# Patient Record
Sex: FEMALE | Race: WHITE | NOT HISPANIC OR LATINO | Employment: FULL TIME | URBAN - METROPOLITAN AREA
[De-identification: names, ages, dates, MRNs, and addresses within clinical notes are randomized per-mention and may not be internally consistent; named-entity substitution may affect disease eponyms.]

---

## 2017-03-16 DIAGNOSIS — C73 CANCER OF THYROID (HCC): ICD-10-CM

## 2017-03-16 RX ORDER — LEVOTHYROXINE SODIUM 100 MCG
TABLET ORAL
Qty: 30 TABLET | Refills: 3 | Status: SHIPPED | OUTPATIENT
Start: 2017-03-16 | End: 2017-07-24 | Stop reason: SDUPTHER

## 2017-07-24 DIAGNOSIS — C73 CANCER OF THYROID (HCC): ICD-10-CM

## 2017-07-24 RX ORDER — LEVOTHYROXINE SODIUM 100 MCG
TABLET ORAL
Qty: 30 TABLET | Refills: 1 | Status: SHIPPED | OUTPATIENT
Start: 2017-07-24 | End: 2017-09-18 | Stop reason: SDUPTHER

## 2017-08-22 ENCOUNTER — APPOINTMENT (OUTPATIENT)
Dept: WOMENS IMAGING | Facility: HOSPITAL | Age: 57
End: 2017-08-22

## 2017-08-22 PROCEDURE — 77067 SCR MAMMO BI INCL CAD: CPT | Performed by: RADIOLOGY

## 2017-09-18 DIAGNOSIS — C73 CANCER OF THYROID (HCC): ICD-10-CM

## 2017-09-18 RX ORDER — LEVOTHYROXINE SODIUM 100 MCG
TABLET ORAL
Qty: 30 TABLET | Refills: 3 | Status: SHIPPED | OUTPATIENT
Start: 2017-09-18 | End: 2017-11-16

## 2017-11-10 ENCOUNTER — OFFICE VISIT (OUTPATIENT)
Dept: ENDOCRINOLOGY | Age: 57
End: 2017-11-10

## 2017-11-10 VITALS
SYSTOLIC BLOOD PRESSURE: 110 MMHG | DIASTOLIC BLOOD PRESSURE: 80 MMHG | WEIGHT: 199.6 LBS | HEART RATE: 69 BPM | HEIGHT: 67 IN | OXYGEN SATURATION: 96 % | BODY MASS INDEX: 31.33 KG/M2

## 2017-11-10 DIAGNOSIS — C73 PAPILLARY THYROID CARCINOMA (HCC): ICD-10-CM

## 2017-11-10 DIAGNOSIS — E89.0 POST-OPERATIVE HYPOTHYROIDISM: Primary | ICD-10-CM

## 2017-11-10 DIAGNOSIS — E55.9 VITAMIN D DEFICIENCY: ICD-10-CM

## 2017-11-10 DIAGNOSIS — R53.82 CHRONIC FATIGUE: ICD-10-CM

## 2017-11-10 DIAGNOSIS — R68.89 ABNORMAL WEIGHT: ICD-10-CM

## 2017-11-10 PROCEDURE — 99214 OFFICE O/P EST MOD 30 MIN: CPT | Performed by: INTERNAL MEDICINE

## 2017-11-10 NOTE — PROGRESS NOTES
57 y.o.    Patient Care Team:  Morris Carrington DO as PCP - General  Morris Carrington DO as PCP - Family Medicine    Chief Complaint:      F/U POSTOPERATIVE HYPOTHYROIDISM.  PAPILLARY THYROID CANCER.  HERE TO DISCUSS LAB RESULTS.  Subjective     HPI   Patient is a 57-year-old white female with a past history of papillary thyroid cancer diagnosed in 2003.  Came for follow-up  Postoperatively there was no vascular invasion and that her lymph nodes were negative for malignancy  She was also known to have Hashimoto's thyroiditis in the background    Postoperative hypothyroidism  Patient is currently taking Synthroid brand name 100 µg daily.  She is tolerating the medication well.  She reports compliance  She denies any side effects  Papillary thyroid cancer status post total thyroidectomy  Patient surgery was nearly 14 years ago and her TSH could be more in the normal range but below 2  Abnormal weight gain  Patient gained an additional weight since last year.  She currently weighs 199 pounds with a BMI of 31.3.  Fatigue  Patient reports constant fatigue.  She reports that her stress levels are very high recently, she resigned from her job, she is taking care of her elderly mother  Hyperlipidemia  Patient reports that she was unable to take any statin drugs in the past due to side effects.  Her last LDL was 124  Patient is a strong family history of coronary artery disease  Vitamin D deficiency  Patient is currently taking 2000 units vitamin D daily       Interval History        The patient had undergone radioiodine ablation following a total thyroidectomy. She was then followed by a whole body scan which only showed activity in the thyroid bed. She has had Thyrogen stimulated whole body scans that were negative. She had a CT scan of her neck in 2007 that showed multiple benign lymph nodes in her neck. I also performed a thyroid ultrasound and 2013 which showed multiple benign lymph nodes that were about 1.5 cm.  Thyroglobulin levels have been undetectable and she has had negative anti-thyroglobulin antibodies levels.      Her TSH has been 0 over the last 7 years. She is currently on Synthroid 125 mcg daily takes it in the morning on an empty stomach and waits before eating.      Her complaints today include problems with memory, decreased concentration, fatigue, weight gain although her weight has been stable per our records. She has also dry skin.  Patient reported that she used to experience tremors and palpitations at the dose of 125 mcg and since her dose was reduced to 100 mcg she does not experience these symptoms anymore  She has been menopausal for the last 5 years.   Hypothyroidism: The patient is being seen for follow-up of hypothyroidism. The patient has Hashimoto's thyroiditis, surgically induced hypothyroidism and papillary thyroid cancer. Current treatment includes levothyroxine, levothyroxine (Synthroid) and 100 QEAM. Symptoms: fatigue, weight gain, cold intolerance, depression, constipation and dry skin, but no hoarseness. The patient is currently experiencing symptoms.       The following portions of the patient's history were reviewed and updated as appropriate: allergies, current medications, past family history, past medical history, past social history, past surgical history and problem list.    Past Medical History:   Diagnosis Date   • Anxiety and depression    • Hypothyroidism    • Raynaud's syndrome    • Thyroid cancer      Family History   Problem Relation Age of Onset   • Hypertension Mother    • Colon cancer Father    • Thyroid disease Father      Social History     Social History   • Marital status:      Spouse name: N/A   • Number of children: N/A   • Years of education: N/A     Occupational History   • Not on file.     Social History Main Topics   • Smoking status: Never Smoker   • Smokeless tobacco: Not on file   • Alcohol use No   • Drug use: No   • Sexual activity: Not on file  "    Other Topics Concern   • Not on file     Social History Narrative   • No narrative on file     Allergies   Allergen Reactions   • Ceftriaxone      rash   • Codeine    • Ibuprofen      PALPATIONS   • Statins      JOINT PAIN       Current Outpatient Prescriptions:   •  Cholecalciferol (VITAMIN D3) 2000 UNITS capsule, Take  by mouth., Disp: , Rfl:   •  Coral Calcium 1000 (390 CA) MG tablet, Take  by mouth., Disp: , Rfl:   •  escitalopram (LEXAPRO) 10 MG tablet, , Disp: , Rfl: 0  •  naproxen sodium (ALEVE) 220 MG tablet, Take 220 mg by mouth 2 (Two) Times a Day As Needed for mild pain (1-3)., Disp: , Rfl:   •  SYNTHROID 100 MCG tablet, TAKE ONE TABLET BY MOUTH EVERY DAY, Disp: 30 tablet, Rfl: 3    Labs:   TSH   Date Value Ref Range Status   11/07/2016 0.359 0.270 - 4.200 mIU/mL Final   , No components found for: T4,   Free T4   Date Value Ref Range Status   11/07/2016 1.50 0.93 - 1.70 ng/dL Final   , No components found for: THYROGLOBULIN, No components found for: ANTITHYROGLOBULN         Review of Systems   Constitutional: Negative for chills, fatigue and fever.   Cardiovascular: Negative for chest pain and palpitations.   Gastrointestinal: Negative for abdominal pain, constipation, diarrhea, nausea and vomiting.   Endocrine: Positive for cold intolerance. Negative for heat intolerance.   Musculoskeletal: Positive for back pain.   Hematological: Bruises/bleeds easily.   All other systems reviewed and are negative.      Objective       Vitals:    11/10/17 1031   BP: 110/80   Pulse: 69   SpO2: 96%   Weight: 199 lb 9.6 oz (90.5 kg)   Height: 67\" (170.2 cm)     Body mass index is 31.26 kg/(m^2).    Physical Exam   Constitutional: She is oriented to person, place, and time. She appears well-developed and well-nourished.   Obese   Eyes: EOM are normal. Pupils are equal, round, and reactive to light.   Neck: Normal range of motion. Neck supple. Thyromegaly: thyroidectomy scar normal.   Cardiovascular: Normal rate, regular " rhythm, normal heart sounds and intact distal pulses.    Pulmonary/Chest: Effort normal and breath sounds normal.   Abdominal: Soft. Bowel sounds are normal. She exhibits distension. There is no tenderness.   Musculoskeletal: Normal range of motion. She exhibits no edema.   Lymphadenopathy:     She has no cervical adenopathy.   Neurological: She is alert and oriented to person, place, and time. She has normal reflexes.   Skin: Skin is warm and dry.   Psychiatric: She has a normal mood and affect. Her behavior is normal.   Nursing note and vitals reviewed.      Results Review:     I reviewed the patient's new clinical results.    Medical records reviewed  Summary:      Office Visit on 11/07/2016   Component Date Value Ref Range Status   • Glucose 11/07/2016 97  65 - 99 mg/dL Final   • BUN 11/07/2016 19  6 - 20 mg/dL Final   • Creatinine 11/07/2016 0.84  0.57 - 1.00 mg/dL Final   • eGFR Non  Am 11/07/2016 71  >60 mL/min/1.73 Final   • eGFR African Am 11/07/2016 86  >60 mL/min/1.73 Final   • BUN/Creatinine Ratio 11/07/2016 22.6  7.0 - 25.0 Final   • Sodium 11/07/2016 142  136 - 145 mmol/L Final   • Potassium 11/07/2016 4.3  3.5 - 5.2 mmol/L Final   • Chloride 11/07/2016 102  98 - 107 mmol/L Final   • Total CO2 11/07/2016 25.9  22.0 - 29.0 mmol/L Final   • Calcium 11/07/2016 9.9  8.6 - 10.5 mg/dL Final   • Total Protein 11/07/2016 7.1  6.0 - 8.5 g/dL Final   • Albumin 11/07/2016 4.70  3.50 - 5.20 g/dL Final   • Globulin 11/07/2016 2.4  gm/dL Final   • A/G Ratio 11/07/2016 2.0  g/dL Final   • Total Bilirubin 11/07/2016 0.4  0.1 - 1.2 mg/dL Final   • Alkaline Phosphatase 11/07/2016 60  39 - 117 U/L Final   • AST (SGOT) 11/07/2016 18  1 - 32 U/L Final   • ALT (SGPT) 11/07/2016 15  1 - 33 U/L Final   • TSH 11/07/2016 0.359  0.270 - 4.200 mIU/mL Final   • Free T4 11/07/2016 1.50  0.93 - 1.70 ng/dL Final   • Thyroglobulin Ab 11/07/2016 <1.0  IU/mL Final    Comment: Reference Range:  <1.0          Negative  > or = 1.0     Positive     • Thyroglobulin 11/07/2016 <0.1  ng/mL Final    Comment: Reference Range:  >17y: 1.5 - 38.5  According to the National Academy of Clinical Biochemistry,  the reference interval for Thyroglobulin (TG) should be  related to euthyroid patients and not for patients who  underwent thyroidectomy.  TG reference intervals for these  patients depend on the residual mass of the thyroid tissue  left after surgery.  Establishing a post-operative baseline  is recommended.  The assay quantitation limit is 0.1 ng/mL.     • Thyroglobulin (TG-MAYURI) 11/07/2016 Comment  ng/mL Final    Not applicable   • 25 Hydroxy, Vitamin D 11/07/2016 37.8  30.0 - 100.0 ng/mL Final    Comment: Reference Range for Total Vitamin D 25(OH)  Deficiency    <20.0 ng/mL  Insufficiency 21-29 ng/mL  Sufficiency    ng/mL  Toxicity      >100 ng/ml          No results found for: HGBA1C  Lab Results   Component Value Date    CREATININE 0.84 11/07/2016     Imaging Results (most recent)     None                Assessment and Plan:    Tiera was seen today for hypothyroidism and thyroid cancer.    Diagnoses and all orders for this visit:    Post-operative hypothyroidism  -     Comprehensive Metabolic Panel  -     T4, Free  -     TSH  -     Comprehensive Thyroglobulin  -     Vitamin D 25 Hydroxy    Papillary thyroid carcinoma  -     Comprehensive Metabolic Panel  -     T4, Free  -     TSH  -     Comprehensive Thyroglobulin  -     Vitamin D 25 Hydroxy    Abnormal weight  -     Comprehensive Metabolic Panel  -     T4, Free  -     TSH  -     Comprehensive Thyroglobulin  -     Vitamin D 25 Hydroxy    Chronic fatigue  -     Comprehensive Metabolic Panel  -     T4, Free  -     TSH  -     Comprehensive Thyroglobulin  -     Vitamin D 25 Hydroxy    Vitamin D deficiency  -     Comprehensive Metabolic Panel  -     T4, Free  -     TSH  -     Comprehensive Thyroglobulin  -     Vitamin D 25 Hydroxy          #1 postoperative hypothyroidism  #2 papillary thyroid  cancer 13 years ago status post total thyroidectomy and radioactive iodine ablation.  #3 abnormal weight gain  #4 fatigue  #5 vitamin D deficiency      Patient's last ultrasound of the neck was in 2013 which showed several lymph nodes but apparently all were benign in appearance  Patient's last a CT scan of the neck was in 2007 once again positive for only benign-appearing lymph nodes     Patient is currently taking Synthroid 100 µg daily.  She reports compliance with the medication and denies any side effects    Patient is still taking vitamin D3 2000 units daily  Her vitamin D level was low normal  Patient reports that she had a DEXA scan done recently and was positive for osteopenia    Patient's LDL cholesterol is also elevated and patient cannot take any cholesterol medication per history  I recommend that she might try CoQ10 as recommended on the bottle    Patient will get lab testing done today  She will return to follow-up in one year.    The total time spent for old record and lab review and face- to- face was more than 25 min of which greater than 15 min of time was spent on counseling the patient on recommended evaluation and treatment options, instructions for management/treatment and /or follow up  and importance of compliance with chosen management or treatment options  Kaleb Arroyo MD. FACE    11/10/17      EMR Dragon / transcription disclaimer:    Much of this encounter note is an electronic transcription/ translation of spoken language to printed text.  Electronic translation of spoken language may permit erroneous, or at times, nonsensical words or phrases to be inadvertently transcribed; although I have reviewed the note for such errors, some may still exist.

## 2017-11-15 LAB
25(OH)D3+25(OH)D2 SERPL-MCNC: 38.2 NG/ML (ref 30–100)
ALBUMIN SERPL-MCNC: 4.5 G/DL (ref 3.5–5.2)
ALBUMIN/GLOB SERPL: 1.5 G/DL
ALP SERPL-CCNC: 72 U/L (ref 39–117)
ALT SERPL-CCNC: 15 U/L (ref 1–33)
AST SERPL-CCNC: 19 U/L (ref 1–32)
BILIRUB SERPL-MCNC: 0.3 MG/DL (ref 0.1–1.2)
BUN SERPL-MCNC: 20 MG/DL (ref 6–20)
BUN/CREAT SERPL: 25.3 (ref 7–25)
CALCIUM SERPL-MCNC: 9.7 MG/DL (ref 8.6–10.5)
CHLORIDE SERPL-SCNC: 101 MMOL/L (ref 98–107)
CO2 SERPL-SCNC: 27 MMOL/L (ref 22–29)
CREAT SERPL-MCNC: 0.79 MG/DL (ref 0.57–1)
GFR SERPLBLD CREATININE-BSD FMLA CKD-EPI: 76 ML/MIN/1.73
GFR SERPLBLD CREATININE-BSD FMLA CKD-EPI: 92 ML/MIN/1.73
GLOBULIN SER CALC-MCNC: 3.1 GM/DL
GLUCOSE SERPL-MCNC: 95 MG/DL (ref 65–99)
POTASSIUM SERPL-SCNC: 4.6 MMOL/L (ref 3.5–5.2)
PROT SERPL-MCNC: 7.6 G/DL (ref 6–8.5)
SODIUM SERPL-SCNC: 141 MMOL/L (ref 136–145)
T4 FREE SERPL-MCNC: 1.53 NG/DL (ref 0.93–1.7)
THYROGLOB AB SERPL-ACNC: <1 IU/ML
THYROGLOB SERPL-MCNC: <0.1 NG/ML
THYROGLOB SERPL-MCNC: NORMAL NG/ML
TSH SERPL DL<=0.005 MIU/L-ACNC: 1.15 MIU/ML (ref 0.27–4.2)

## 2017-11-16 DIAGNOSIS — C73 CANCER OF THYROID (HCC): ICD-10-CM

## 2017-11-16 RX ORDER — LEVOTHYROXINE SODIUM 112 MCG
112 TABLET ORAL DAILY
Qty: 30 TABLET | Refills: 5 | Status: SHIPPED | OUTPATIENT
Start: 2017-11-16 | End: 2017-11-17 | Stop reason: SDUPTHER

## 2017-11-17 RX ORDER — LEVOTHYROXINE SODIUM 112 MCG
112 TABLET ORAL DAILY
Qty: 30 TABLET | Refills: 5 | Status: SHIPPED | OUTPATIENT
Start: 2017-11-17 | End: 2018-05-02 | Stop reason: SDUPTHER

## 2018-05-02 RX ORDER — LEVOTHYROXINE SODIUM 112 MCG
TABLET ORAL
Qty: 30 TABLET | Refills: 4 | Status: SHIPPED | OUTPATIENT
Start: 2018-05-02 | End: 2018-09-12 | Stop reason: SDUPTHER

## 2018-06-25 ENCOUNTER — TELEPHONE (OUTPATIENT)
Dept: ENDOCRINOLOGY | Age: 58
End: 2018-06-25

## 2018-06-25 NOTE — TELEPHONE ENCOUNTER
----- Message from Kaleb SHORT MD sent at 6/22/2018  3:59 PM EDT -----  Contact: PATIENT  Patient should be okay to start this new diet as long as she takes her Synthroid 1 hour before eating or drinking    ----- Message -----  From: Mariel Valencia MA  Sent: 6/22/2018   3:58 PM  To: Kaleb SHORT MD        ----- Message -----  From: Lukasz Maria  Sent: 6/15/2018   9:03 AM  To: Mariel Valencia MA    PATIENT IS THINKING OF STARTING OPTAVIA DIET, USE TO BE MEDIFAST, PATIENT WOULD LIKE DR. NEGRON OPTION IF THIS WOULD BE GOOD FOR HER OR NOT.     PATIENT DOES NOT WANT TO DO ANYTHING THAT WOULD INTERFERE WITH HER SYNTHROID.     PATIENT WOULD EAT 6 TIMES A DAY SMALLER MEALS, WOULD RECEIVE EXPERT COACHING AND ADVISE,  ALONG WITH FUELING, ( PROTEIN BARS AND OR CEREALS)  PATIENT STATED SHE WILL EAT 5 OF THEIR MEALS AND ONE OF HERS.     THE PRODUCT HAS SOY, AND PATIENT STATED SHE THINKS SOY INTERFERES WITH SYNTHROID. SHE DID NOT KNOW IF SHE COULD TAKE THE SYNTHROID EARLY IN THE MORNING AND BE OK EATING THE FOOD.     BEST # FOR PATIENT [phone]    PATIENT NOTIFIED

## 2018-08-23 ENCOUNTER — APPOINTMENT (OUTPATIENT)
Dept: WOMENS IMAGING | Facility: HOSPITAL | Age: 58
End: 2018-08-23

## 2018-08-23 PROCEDURE — 77063 BREAST TOMOSYNTHESIS BI: CPT | Performed by: RADIOLOGY

## 2018-08-23 PROCEDURE — 77067 SCR MAMMO BI INCL CAD: CPT | Performed by: RADIOLOGY

## 2018-09-13 RX ORDER — LEVOTHYROXINE SODIUM 112 MCG
TABLET ORAL
Qty: 30 TABLET | Refills: 1 | Status: SHIPPED | OUTPATIENT
Start: 2018-09-13 | End: 2018-11-12

## 2018-11-02 ENCOUNTER — LAB (OUTPATIENT)
Dept: ENDOCRINOLOGY | Age: 58
End: 2018-11-02

## 2018-11-02 DIAGNOSIS — E55.9 VITAMIN D DEFICIENCY: ICD-10-CM

## 2018-11-02 DIAGNOSIS — E89.0 POST-OPERATIVE HYPOTHYROIDISM: ICD-10-CM

## 2018-11-02 DIAGNOSIS — C73 CANCER OF THYROID (HCC): ICD-10-CM

## 2018-11-02 DIAGNOSIS — C73 CANCER OF THYROID (HCC): Primary | ICD-10-CM

## 2018-11-04 LAB
25(OH)D3+25(OH)D2 SERPL-MCNC: 70.7 NG/ML (ref 30–100)
ALBUMIN SERPL-MCNC: 4.3 G/DL (ref 3.5–5.2)
ALBUMIN/GLOB SERPL: 1.6 G/DL
ALP SERPL-CCNC: 58 U/L (ref 39–117)
ALT SERPL-CCNC: 16 U/L (ref 1–33)
AST SERPL-CCNC: 17 U/L (ref 1–32)
BILIRUB SERPL-MCNC: 0.4 MG/DL (ref 0.1–1.2)
BUN SERPL-MCNC: 20 MG/DL (ref 6–20)
BUN/CREAT SERPL: 24.7 (ref 7–25)
CALCIUM SERPL-MCNC: 9.7 MG/DL (ref 8.6–10.5)
CHLORIDE SERPL-SCNC: 107 MMOL/L (ref 98–107)
CO2 SERPL-SCNC: 26.7 MMOL/L (ref 22–29)
CREAT SERPL-MCNC: 0.81 MG/DL (ref 0.57–1)
GLOBULIN SER CALC-MCNC: 2.7 GM/DL
GLUCOSE SERPL-MCNC: 101 MG/DL (ref 65–99)
POTASSIUM SERPL-SCNC: 4.5 MMOL/L (ref 3.5–5.2)
PROT SERPL-MCNC: 7 G/DL (ref 6–8.5)
SODIUM SERPL-SCNC: 145 MMOL/L (ref 136–145)
T4 FREE SERPL-MCNC: 1.71 NG/DL (ref 0.93–1.7)
THYROGLOB AB SERPL-ACNC: <1 IU/ML
THYROGLOB SERPL-MCNC: 0.1 NG/ML
THYROGLOB SERPL-MCNC: NORMAL NG/ML
TSH SERPL DL<=0.005 MIU/L-ACNC: 0.12 MIU/ML (ref 0.27–4.2)

## 2018-11-12 ENCOUNTER — OFFICE VISIT (OUTPATIENT)
Dept: ENDOCRINOLOGY | Age: 58
End: 2018-11-12

## 2018-11-12 VITALS
WEIGHT: 158.2 LBS | HEART RATE: 70 BPM | HEIGHT: 67 IN | SYSTOLIC BLOOD PRESSURE: 118 MMHG | BODY MASS INDEX: 24.83 KG/M2 | DIASTOLIC BLOOD PRESSURE: 62 MMHG

## 2018-11-12 DIAGNOSIS — E55.9 VITAMIN D DEFICIENCY: ICD-10-CM

## 2018-11-12 DIAGNOSIS — E89.0 POST-OPERATIVE HYPOTHYROIDISM: Primary | ICD-10-CM

## 2018-11-12 DIAGNOSIS — R68.89 ABNORMAL WEIGHT: ICD-10-CM

## 2018-11-12 DIAGNOSIS — R53.82 CHRONIC FATIGUE: ICD-10-CM

## 2018-11-12 DIAGNOSIS — C73 PAPILLARY THYROID CARCINOMA (HCC): ICD-10-CM

## 2018-11-12 PROCEDURE — 99214 OFFICE O/P EST MOD 30 MIN: CPT | Performed by: INTERNAL MEDICINE

## 2018-11-12 RX ORDER — INFLUENZA A VIRUS A/MICHIGAN/45/2015 X-275 (H1N1) ANTIGEN (FORMALDEHYDE INACTIVATED), INFLUENZA A VIRUS A/SINGAPORE/INFIMH-16-0019/2016 IVR-186 (H3N2) ANTIGEN (FORMALDEHYDE INACTIVATED), INFLUENZA B VIRUS B/PHUKET/3073/2013 ANTIGEN (FORMALDEHYDE INACTIVATED), AND INFLUENZA B VIRUS B/MARYLAND/15/2016 BX-69A ANTIGEN (FORMALDEHYDE INACTIVATED) 15; 15; 15; 15 UG/.5ML; UG/.5ML; UG/.5ML; UG/.5ML
INJECTION, SUSPENSION INTRAMUSCULAR SEE ADMIN INSTRUCTIONS
Refills: 0 | COMMUNITY
Start: 2018-10-12

## 2018-11-12 RX ORDER — AMPICILLIN TRIHYDRATE 250 MG
CAPSULE ORAL
COMMUNITY
End: 2023-02-28

## 2018-11-12 RX ORDER — MOMETASONE FUROATE 50 UG/1
SPRAY, METERED NASAL
COMMUNITY
Start: 2016-04-17

## 2018-11-12 RX ORDER — HEPATITIS A VACCINE 1440 [IU]/ML
INJECTION, SUSPENSION INTRAMUSCULAR SEE ADMIN INSTRUCTIONS
Refills: 1 | COMMUNITY
Start: 2018-10-23

## 2018-11-12 RX ORDER — ACETAMINOPHEN 160 MG
TABLET,DISINTEGRATING ORAL
COMMUNITY
Start: 2014-03-19

## 2018-11-12 RX ORDER — LEVOTHYROXINE SODIUM 88 MCG
88 TABLET ORAL DAILY
Qty: 30 TABLET | Refills: 11 | Status: SHIPPED | OUTPATIENT
Start: 2018-11-12 | End: 2019-10-08 | Stop reason: SDUPTHER

## 2018-11-12 NOTE — PROGRESS NOTES
58 y.o.    Patient Care Team:  Morris Carrington DO as PCP - General  Morris Carrington DO as PCP - Family Medicine    Chief Complaint:      F/U POSTOPERATIVE HYPOTHYROIDISM.  PAPILLARY THYROID CANCER.  HERE TO DISCUSS LAB RESULTS.     Subjective   HPI  Patient is a 58-year-old white female with a past history of papillary thyroid cancer status post thyroidectomy 2003 and postoperative hypothyroidism came for follow-up    Papillary thyroid cancer status post surgery  Postoperatively there was no vascular invasion and lymph nodes were negative for malignancy  Patient is also known to have Hashimoto's thyroiditis in the background    Postoperative hypothyroidism  Patient is currently taking Synthroid brand-name 100 µg daily.  She reports compliance with the medication  She is tolerating the medication well  She denies any side effects  Abnormal weight gain  Patient actually managed to lose weight  She lost nearly 41 pounds in the past one year  Her BMI is to be 31.3 and currently 24.8  She has been controlling her diet and exercising as well  Fatigue  Patient continues to report fatigue especially when the stress levels are high  Hyperlipidemia  Patient has not been able to take any statin drugs in the past  She was advised to go Q10 but apparently she has not started it  Vitamin D deficiency  Patient is currently taking vitamin D3 daily 2000 units    Hypothyroidism   Pertinent negatives include no abdominal pain, chest pain, chills, fatigue, fever, nausea or vomiting.         Interval History    The patient had undergone radioiodine ablation following a total thyroidectomy. She was then followed by a whole body scan which only showed activity in the thyroid bed. She has had Thyrogen stimulated whole body scans that were negative. She had a CT scan of her neck in 2007 that showed multiple benign lymph nodes in her neck. I also performed a thyroid ultrasound and 2013 which showed multiple benign lymph nodes that were about  1.5 cm. Thyroglobulin levels have been undetectable and she has had negative anti-thyroglobulin antibodies levels.      Her TSH has been 0 over the last 7 years. She is currently on Synthroid 125 mcg daily takes it in the morning on an empty stomach and waits before eating.      Her complaints today include problems with memory, decreased concentration, fatigue, weight gain although her weight has been stable per our records. She has also dry skin.  Patient reported that she used to experience tremors and palpitations at the dose of 125 mcg and since her dose was reduced to 100 mcg she does not experience these symptoms anymore  She has been menopausal for the last 5 years.   Hypothyroidism: The patient is being seen for follow-up of hypothyroidism. The patient has Hashimoto's thyroiditis, surgically induced hypothyroidism and papillary thyroid cancer. Current treatment includes levothyroxine, levothyroxine (Synthroid) and 100 QEAM. Symptoms: fatigue, weight gain, cold intolerance, depression, constipation and dry skin, but no hoarseness. The patient is currently experiencing symptoms.         The following portions of the patient's history were reviewed and updated as appropriate: allergies, current medications, past family history, past medical history, past social history, past surgical history and problem list.    Past Medical History:   Diagnosis Date   • Anxiety and depression    • Hypothyroidism    • Raynaud's syndrome    • Thyroid cancer (CMS/HCC)      Family History   Problem Relation Age of Onset   • Hypertension Mother    • Colon cancer Father    • Thyroid disease Father      Social History     Socioeconomic History   • Marital status:      Spouse name: Not on file   • Number of children: Not on file   • Years of education: Not on file   • Highest education level: Not on file   Social Needs   • Financial resource strain: Not on file   • Food insecurity - worry: Not on file   • Food insecurity -  "inability: Not on file   • Transportation needs - medical: Not on file   • Transportation needs - non-medical: Not on file   Occupational History   • Not on file   Tobacco Use   • Smoking status: Never Smoker   Substance and Sexual Activity   • Alcohol use: No   • Drug use: No   • Sexual activity: Not on file   Other Topics Concern   • Not on file   Social History Narrative   • Not on file     Allergies   Allergen Reactions   • Ceftriaxone Unknown (See Comments)     rash  rash  rash   • Codeine Nausea And Vomiting   • Ibuprofen      PALPATIONS   • Statins      JOINT PAIN       Current Outpatient Medications:   •  Cholecalciferol (VITAMIN D3) 2000 UNITS capsule, Take  by mouth., Disp: , Rfl:   •  Coral Calcium 1000 (390 CA) MG tablet, Take  by mouth., Disp: , Rfl:   •  escitalopram (LEXAPRO) 10 MG tablet, , Disp: , Rfl: 0  •  naproxen sodium (ALEVE) 220 MG tablet, Take 220 mg by mouth 2 (Two) Times a Day As Needed for mild pain (1-3)., Disp: , Rfl:   •  SYNTHROID 112 MCG tablet, TAKE ONE TABLET BY MOUTH DAILY, Disp: 30 tablet, Rfl: 1        Review of Systems   Constitutional: Negative for chills, fatigue and fever.   Cardiovascular: Negative for chest pain and palpitations.   Gastrointestinal: Negative for abdominal pain, constipation, diarrhea, nausea and vomiting.   Endocrine: Positive for cold intolerance. Negative for heat intolerance.   All other systems reviewed and are negative.      Objective       Vitals:    11/12/18 1123   BP: 118/62   Pulse: 70   Weight: 71.8 kg (158 lb 3.2 oz)   Height: 170.2 cm (67\")     Body mass index is 24.78 kg/m².      Physical Exam   Constitutional: She is oriented to person, place, and time. She appears well-developed and well-nourished.   Obese   Eyes: EOM are normal. Pupils are equal, round, and reactive to light.   Neck: Normal range of motion. Neck supple. Thyromegaly: thyroidectomy scar normal.   Cardiovascular: Normal rate, regular rhythm, normal heart sounds and intact " distal pulses.   Pulmonary/Chest: Effort normal and breath sounds normal.   Abdominal: Soft. Bowel sounds are normal. She exhibits distension. There is no tenderness.   Musculoskeletal: Normal range of motion. She exhibits no edema.   Lymphadenopathy:     She has no cervical adenopathy.   Neurological: She is alert and oriented to person, place, and time. She has normal reflexes.   Skin: Skin is warm and dry.   Psychiatric: She has a normal mood and affect. Her behavior is normal.   Nursing note and vitals reviewed.    Results Review:     I reviewed the patient's new clinical results.    Medical records reviewed  Summary:      Lab on 11/02/2018   Component Date Value Ref Range Status   • Glucose 11/02/2018 101* 65 - 99 mg/dL Final   • BUN 11/02/2018 20  6 - 20 mg/dL Final   • Creatinine 11/02/2018 0.81  0.57 - 1.00 mg/dL Final   • eGFR Non  Am 11/02/2018 73  >60 mL/min/1.73 Final   • eGFR African Am 11/02/2018 89  >60 mL/min/1.73 Final   • BUN/Creatinine Ratio 11/02/2018 24.7  7.0 - 25.0 Final   • Sodium 11/02/2018 145  136 - 145 mmol/L Final   • Potassium 11/02/2018 4.5  3.5 - 5.2 mmol/L Final   • Chloride 11/02/2018 107  98 - 107 mmol/L Final   • Total CO2 11/02/2018 26.7  22.0 - 29.0 mmol/L Final   • Calcium 11/02/2018 9.7  8.6 - 10.5 mg/dL Final   • Total Protein 11/02/2018 7.0  6.0 - 8.5 g/dL Final   • Albumin 11/02/2018 4.30  3.50 - 5.20 g/dL Final   • Globulin 11/02/2018 2.7  gm/dL Final   • A/G Ratio 11/02/2018 1.6  g/dL Final   • Total Bilirubin 11/02/2018 0.4  0.1 - 1.2 mg/dL Final   • Alkaline Phosphatase 11/02/2018 58  39 - 117 U/L Final   • AST (SGOT) 11/02/2018 17  1 - 32 U/L Final   • ALT (SGPT) 11/02/2018 16  1 - 33 U/L Final   • Free T4 11/02/2018 1.71* 0.93 - 1.70 ng/dL Final   • TSH 11/02/2018 0.118* 0.270 - 4.200 mIU/mL Final   • Thyroglobulin Ab 11/02/2018 <1.0  IU/mL Final    Comment: Reference Range:  <1.0          Negative  > or = 1.0    Positive     • Thyroglobulin 11/02/2018 0.1   ng/mL Final    Comment: Reference Range:  >17y: 1.5 - 38.5  According to the National Academy of Clinical Biochemistry,  the reference interval for Thyroglobulin (TG) should be  related to euthyroid patients and not for patients who  underwent thyroidectomy.  TG reference intervals for these  patients depend on the residual mass of the thyroid tissue  left after surgery.  Establishing a post-operative baseline  is recommended.  The assay quantitation limit is 0.1 ng/mL.     • Thyroglobulin (TG-MAYURI) 11/02/2018 Comment  ng/mL Final    Not applicable   • 25 Hydroxy, Vitamin D 11/02/2018 70.7  30.0 - 100.0 ng/ml Final    Comment: Reference Range for Total Vitamin D 25(OH)  Deficiency    <20.0 ng/mL  Insufficiency 21-29 ng/mL  Sufficiency    ng/mL  Toxicity      >100 ng/ml          No results found for: HGBA1C  Lab Results   Component Value Date    CREATININE 0.81 11/02/2018     Imaging Results (most recent)     None        Assessment and Plan:    Tiera was seen today for hypothyroidism.    Diagnoses and all orders for this visit:    Post-operative hypothyroidism    Papillary thyroid carcinoma (CMS/HCC)    Vitamin D deficiency    Chronic fatigue    Abnormal weight    Other orders  -     SYNTHROID 88 MCG tablet; Take 1 tablet by mouth Daily.           #1 postoperative hypothyroidism  #2 papillary thyroid cancer 13 years ago status post total thyroidectomy and radioactive iodine ablation.  #3 abnormal weight gain  #4 fatigue  #5 vitamin D deficiency      Patient had surgery approximately 15 years ago for thyroid cancer  She is currently taking Synthroid 100 µg daily.  She reports compliance with the medication and denies any side effects    Patient had ultrasound of the neck in 2013 which showed several lymph nodes but they were benign appearance  Patient thyroglobulin levels have always been stable  Patient is still taking vitamin D3 daily 2000 units    Patient is currently not taking any medication for  "cholesterol  She was recommended CoQ10  Lab reports reviewed  Patient's TSH is 0.1 and free T4 is 1.77  I advised the patient to decrease the Synthroid to 88 µg  Especially the fact that that her surgery was nearly 15 years ago she does not need to be maintained at a suppressive level of TSH  -TSH could be below 2.0  Patient verbalized understanding    Patient will return to follow-up in 12 months.    The total time spent  was more than 25 min of which greater than 15 min of time (greater than 50% of the total time)  was spent face to face with the patient counseling and coordination of care on recommended evaluation and treatment options, instructions for management/treatment and /or follow up and importance of compliance with chosen management or treatment options    Kaleb Arroyo MD. FACE    11/12/18      EMR Dragon / transcription disclaimer:     \"Dictated utilizing Dragon dictation\".         "

## 2019-08-27 ENCOUNTER — APPOINTMENT (OUTPATIENT)
Dept: WOMENS IMAGING | Facility: HOSPITAL | Age: 59
End: 2019-08-27

## 2019-08-27 PROCEDURE — 77063 BREAST TOMOSYNTHESIS BI: CPT | Performed by: RADIOLOGY

## 2019-08-27 PROCEDURE — 77067 SCR MAMMO BI INCL CAD: CPT | Performed by: RADIOLOGY

## 2019-10-08 RX ORDER — LEVOTHYROXINE SODIUM 88 MCG
TABLET ORAL
Qty: 30 TABLET | Refills: 1 | Status: SHIPPED | OUTPATIENT
Start: 2019-10-08 | End: 2019-11-26

## 2019-11-12 ENCOUNTER — LAB (OUTPATIENT)
Dept: ENDOCRINOLOGY | Age: 59
End: 2019-11-12

## 2019-11-12 DIAGNOSIS — E55.9 VITAMIN D DEFICIENCY: ICD-10-CM

## 2019-11-12 DIAGNOSIS — C73 CANCER OF THYROID (HCC): ICD-10-CM

## 2019-11-12 DIAGNOSIS — C73 PAPILLARY THYROID CARCINOMA (HCC): ICD-10-CM

## 2019-11-12 DIAGNOSIS — E55.9 VITAMIN D DEFICIENCY: Primary | ICD-10-CM

## 2019-11-13 LAB
25(OH)D3+25(OH)D2 SERPL-MCNC: 39.1 NG/ML (ref 30–100)
ALBUMIN SERPL-MCNC: 4.4 G/DL (ref 3.5–5.2)
ALBUMIN/GLOB SERPL: 1.8 G/DL
ALP SERPL-CCNC: 53 U/L (ref 39–117)
ALT SERPL-CCNC: 13 U/L (ref 1–33)
AST SERPL-CCNC: 14 U/L (ref 1–32)
BILIRUB SERPL-MCNC: 0.3 MG/DL (ref 0.2–1.2)
BUN SERPL-MCNC: 18 MG/DL (ref 6–20)
BUN/CREAT SERPL: 23.1 (ref 7–25)
CALCIUM SERPL-MCNC: 9.2 MG/DL (ref 8.6–10.5)
CHLORIDE SERPL-SCNC: 104 MMOL/L (ref 98–107)
CO2 SERPL-SCNC: 26.8 MMOL/L (ref 22–29)
CREAT SERPL-MCNC: 0.78 MG/DL (ref 0.57–1)
GLOBULIN SER CALC-MCNC: 2.5 GM/DL
GLUCOSE SERPL-MCNC: 94 MG/DL (ref 65–99)
POTASSIUM SERPL-SCNC: 4.6 MMOL/L (ref 3.5–5.2)
PROT SERPL-MCNC: 6.9 G/DL (ref 6–8.5)
SODIUM SERPL-SCNC: 143 MMOL/L (ref 136–145)
T4 FREE SERPL-MCNC: 1.33 NG/DL (ref 0.93–1.7)
THYROPEROXIDASE AB SERPL-ACNC: 19 IU/ML (ref 0–34)
TSH SERPL DL<=0.005 MIU/L-ACNC: 2.74 UIU/ML (ref 0.27–4.2)

## 2019-11-26 ENCOUNTER — OFFICE VISIT (OUTPATIENT)
Dept: ENDOCRINOLOGY | Age: 59
End: 2019-11-26

## 2019-11-26 VITALS
SYSTOLIC BLOOD PRESSURE: 110 MMHG | BODY MASS INDEX: 25.01 KG/M2 | HEART RATE: 60 BPM | DIASTOLIC BLOOD PRESSURE: 66 MMHG | WEIGHT: 165 LBS | HEIGHT: 68 IN

## 2019-11-26 DIAGNOSIS — C73 PAPILLARY THYROID CARCINOMA (HCC): Primary | ICD-10-CM

## 2019-11-26 DIAGNOSIS — E55.9 VITAMIN D DEFICIENCY: ICD-10-CM

## 2019-11-26 DIAGNOSIS — R21 RASH: ICD-10-CM

## 2019-11-26 DIAGNOSIS — R53.82 CHRONIC FATIGUE: ICD-10-CM

## 2019-11-26 DIAGNOSIS — R68.89 ABNORMAL WEIGHT: ICD-10-CM

## 2019-11-26 DIAGNOSIS — E89.0 POST-OPERATIVE HYPOTHYROIDISM: ICD-10-CM

## 2019-11-26 PROCEDURE — 99214 OFFICE O/P EST MOD 30 MIN: CPT | Performed by: INTERNAL MEDICINE

## 2019-11-26 RX ORDER — LEVOTHYROXINE SODIUM 100 MCG
100 TABLET ORAL DAILY
Qty: 90 TABLET | Refills: 2 | Status: SHIPPED | OUTPATIENT
Start: 2019-11-26 | End: 2020-08-24 | Stop reason: SDUPTHER

## 2019-11-26 NOTE — PROGRESS NOTES
59 y.o.    Patient Care Team:  Morris Carrington DO as PCP - General  Morris Carrington DO as PCP - Family Medicine    Chief Complaint:      F/U POSTOPERATIVE HYPOTHYROIDISM.  PAPILLARY THYROID CANCER.  HERE TO DISCUSS LAB RESULTS.  Subjective     HPI   Patient is a 59-year-old white female with a history of papillary thyroid cancer status post total thyroidectomy 2003 and postoperative hypothyroidism came for follow-up    Papillary thyroid cancer status post surgery  This is nearly 17 years ago  Patient thyroglobulin levels have been low  Postoperative hypothyroidism  Patient is currently taking Synthroid brand name 88 mcg  She reports compliance  Denies any side effects  Chronic fatigue  Patient continues to explain his fatigue and is gaining weight  She changed her job recently and is stress eating  Abnormal weight gain  Patient gained nearly 15 pounds in the past 6 months  She is still following the diet Optivia and still gaining weight  Hyperlipidemia  Patient is currently taking co-Q10  She was not able to tolerate statins in the past  Vitamin D deficiency  Patient is currently taking 2000 units of vitamin D3 daily  Rash under the arms  Patient reports bruises but they have been stationary for nearly 2 months   Also reports itching        Interval History    The patient had undergone radioiodine ablation following a total thyroidectomy. She was then followed by a whole body scan which only showed activity in the thyroid bed. She has had Thyrogen stimulated whole body scans that were negative. She had a CT scan of her neck in 2007 that showed multiple benign lymph nodes in her neck. I also performed a thyroid ultrasound and 2013 which showed multiple benign lymph nodes that were about 1.5 cm. Thyroglobulin levels have been undetectable and she has had negative anti-thyroglobulin antibodies levels.      Her TSH has been 0 over the last 7 years. She is currently on Synthroid 125 mcg daily takes it in the morning on  an empty stomach and waits before eating.      Her complaints today include problems with memory, decreased concentration, fatigue, weight gain although her weight has been stable per our records. She has also dry skin.  Patient reported that she used to experience tremors and palpitations at the dose of 125 mcg and since her dose was reduced to 100 mcg she does not experience these symptoms anymore  She has been menopausal for the last 5 years.   The following portions of the patient's history were reviewed and updated as appropriate: allergies, current medications, past family history, past medical history, past social history, past surgical history and problem list.    Past Medical History:   Diagnosis Date   • Anxiety and depression    • Hypothyroidism    • Raynaud's syndrome    • Thyroid cancer (CMS/HCC)      Family History   Problem Relation Age of Onset   • Hypertension Mother    • Colon cancer Father    • Thyroid disease Father      Social History     Socioeconomic History   • Marital status:      Spouse name: Not on file   • Number of children: Not on file   • Years of education: Not on file   • Highest education level: Not on file   Tobacco Use   • Smoking status: Never Smoker   • Smokeless tobacco: Never Used   Substance and Sexual Activity   • Alcohol use: No   • Drug use: No     Allergies   Allergen Reactions   • Ceftriaxone Unknown (See Comments)     rash  rash  rash  rash   • Codeine Nausea And Vomiting   • Ibuprofen      PALPATIONS   • Statins      JOINT PAIN       Current Outpatient Medications:   •  B COMPLEX-C ER PO, Take  by mouth., Disp: , Rfl:   •  calcium carb-cholecalciferol 600-800 MG-UNIT tablet, Take 1 tablet by mouth., Disp: , Rfl:   •  Cholecalciferol (VITAMIN D3) 2000 units capsule, Take  by mouth., Disp: , Rfl:   •  Coenzyme Q10 (COQ10) 200 MG capsule, Take  by mouth., Disp: , Rfl:   •  FLUZONE QUADRIVALENT suspension IM suspension, See Admin Instructions., Disp: , Rfl: 0  •   "HAVRIX 1440 EL U/ML vaccine, See Admin Instructions., Disp: , Rfl: 1  •  Homeopathic Products (PROSACEA) gel, Apply  topically to the appropriate area as directed., Disp: , Rfl:   •  KETOTIFEN FUMARATE OP, Apply 1 drop to eye(s) as directed by provider., Disp: , Rfl:   •  mometasone (NASONEX) 50 MCG/ACT nasal spray, , Disp: , Rfl:   •  naproxen sodium (ALEVE) 220 MG tablet, Take 220 mg by mouth 2 (Two) Times a Day As Needed for mild pain (1-3)., Disp: , Rfl:   •  SYNTHROID 100 MCG tablet, Take 1 tablet by mouth Daily., Disp: 90 tablet, Rfl: 2    Labs:   TSH   Date Value Ref Range Status   11/12/2019 2.740 0.270 - 4.200 uIU/mL Final   09/24/2019 4.300 (H) 0.27 - 4.20 u(iU)/mL Final   , No components found for: T4,   Free T4   Date Value Ref Range Status   11/12/2019 1.33 0.93 - 1.70 ng/dL Final   , No components found for: THYROGLOBULIN, No components found for: ANTITHYROGLOBULN       Review of Systems   Constitutional: Negative for chills, fatigue and fever.   Cardiovascular: Negative for chest pain and palpitations.   Gastrointestinal: Negative for abdominal pain, constipation, diarrhea, nausea and vomiting.   Endocrine: Negative for cold intolerance and heat intolerance.   All other systems reviewed and are negative.      Objective       Vitals:    11/26/19 0848   BP: 110/66   Pulse: 60   Weight: 74.8 kg (165 lb)   Height: 172.7 cm (68\")     Body mass index is 25.09 kg/m².    Physical Exam   Constitutional: She is oriented to person, place, and time. She appears well-developed and well-nourished.   Eyes: EOM are normal. Pupils are equal, round, and reactive to light.   No circumorbital puffiness   Neck: Normal range of motion. Neck supple. Thyromegaly: thyroidectomy scar normal.   Cardiovascular: Normal rate, regular rhythm, normal heart sounds and intact distal pulses.   Pulmonary/Chest: Effort normal and breath sounds normal.   Abdominal: Soft. Bowel sounds are normal. She exhibits distension. There is no " tenderness.   Musculoskeletal: Normal range of motion. She exhibits no edema.   Lymphadenopathy:     She has no cervical adenopathy.   Neurological: She is alert and oriented to person, place, and time. She has normal reflexes.   Skin: Skin is warm and dry.   Psychiatric: She has a normal mood and affect. Her behavior is normal.   Nursing note and vitals reviewed.      Results Review:     I reviewed the patient's new clinical results.    Medical records reviewed  Summary:  Medical records from [place] reviewed  Patient's TSH was 2.7 and previously it was 4.2  Not under control and patient is reporting compliance      Lab on 11/12/2019   Component Date Value Ref Range Status   • Glucose 11/12/2019 94  65 - 99 mg/dL Final   • BUN 11/12/2019 18  6 - 20 mg/dL Final   • Creatinine 11/12/2019 0.78  0.57 - 1.00 mg/dL Final   • eGFR Non African Am 11/12/2019 76  >60 mL/min/1.73 Final   • eGFR African Am 11/12/2019 92  >60 mL/min/1.73 Final   • BUN/Creatinine Ratio 11/12/2019 23.1  7.0 - 25.0 Final   • Sodium 11/12/2019 143  136 - 145 mmol/L Final   • Potassium 11/12/2019 4.6  3.5 - 5.2 mmol/L Final   • Chloride 11/12/2019 104  98 - 107 mmol/L Final   • Total CO2 11/12/2019 26.8  22.0 - 29.0 mmol/L Final   • Calcium 11/12/2019 9.2  8.6 - 10.5 mg/dL Final   • Total Protein 11/12/2019 6.9  6.0 - 8.5 g/dL Final   • Albumin 11/12/2019 4.40  3.50 - 5.20 g/dL Final   • Globulin 11/12/2019 2.5  gm/dL Final   • A/G Ratio 11/12/2019 1.8  g/dL Final   • Total Bilirubin 11/12/2019 0.3  0.2 - 1.2 mg/dL Final   • Alkaline Phosphatase 11/12/2019 53  39 - 117 U/L Final   • AST (SGOT) 11/12/2019 14  1 - 32 U/L Final   • ALT (SGPT) 11/12/2019 13  1 - 33 U/L Final   • 25 Hydroxy, Vitamin D 11/12/2019 39.1  30.0 - 100.0 ng/ml Final    Comment: Reference Range for Total Vitamin D 25(OH)  Deficiency <20.0 ng/mL  Insufficiency 21-29 ng/mL  Sufficiency  ng/mL  Toxicity >100 ng/ml     • Thyroid Peroxidase Antibody 11/12/2019 19  0 - 34 IU/mL  Final   • Free T4 11/12/2019 1.33  0.93 - 1.70 ng/dL Final   • TSH 11/12/2019 2.740  0.270 - 4.200 uIU/mL Final     Lab Results   Component Value Date    HGBA1C 5.6 09/24/2019     Lab Results   Component Value Date    CREATININE 0.78 11/12/2019     Imaging Results (Most Recent)     None                Assessment and Plan:    Tiera was seen today for hypothyroidism and thyroid cancer.    Diagnoses and all orders for this visit:    Papillary thyroid carcinoma (CMS/HCC)    Post-operative hypothyroidism    Vitamin D deficiency    Abnormal weight    Chronic fatigue    Rash    Other orders  -     SYNTHROID 100 MCG tablet; Take 1 tablet by mouth Daily.    Postoperative hypothyroidism  Patient is currently taking Synthroid 80 mcg daily.  Reports compliance and denies any side effects  Papillary thyroid cancer status post total thyroidectomy 17 years ago  Patient's TSH goal is just below 2.0  High TSH was 2.8  I recommend increasing the dose Synthroid 100 mcg  Prescription sent to [place] [facility]    Patient gained nearly 15 pounds  I encouraged her to consider controlling her diet and weight loss  She is currently using a diet but being a high price for that Optivia    Rash on her arms bilaterally  I advised her to change her deodorant  Since it has been 2 months she may need to see a dermatologist  Patient return to follow-up in 1 year            Kaleb Arroyo MD. FACE    11/26/19      EMR Dragon / transcription disclaimer:    Much of this encounter note is an electronic transcription/ translation of spoken language to printed text.  Electronic translation of spoken language may permit erroneous, or at times, nonsensical words or phrases to be inadvertently transcribed; although I have reviewed the note for such errors, some may still exist.

## 2020-08-24 RX ORDER — LEVOTHYROXINE SODIUM 100 MCG
100 TABLET ORAL DAILY
Qty: 90 TABLET | Refills: 2 | Status: SHIPPED | OUTPATIENT
Start: 2020-08-24 | End: 2020-12-03 | Stop reason: SDUPTHER

## 2020-08-24 NOTE — TELEPHONE ENCOUNTER
Patient is calling in and is needing a med refill for SYNTHROID 100 MCG tablet [6059]      Patient's pharmacy is   [place], [place] - [address] - [phone]  - [phone] FX Phone:  [phone] Fax:  [phone]    [facility]  [address] [facility] [address]      Pharmacy Comments:  --         If any questions, please call [phone]

## 2020-10-15 ENCOUNTER — APPOINTMENT (OUTPATIENT)
Dept: WOMENS IMAGING | Facility: HOSPITAL | Age: 60
End: 2020-10-15

## 2020-10-15 PROCEDURE — 77063 BREAST TOMOSYNTHESIS BI: CPT | Performed by: RADIOLOGY

## 2020-10-15 PROCEDURE — 77067 SCR MAMMO BI INCL CAD: CPT | Performed by: RADIOLOGY

## 2020-11-19 DIAGNOSIS — E55.9 VITAMIN D DEFICIENCY: Primary | ICD-10-CM

## 2020-11-19 DIAGNOSIS — E89.0 POSTABLATIVE HYPOTHYROIDISM: ICD-10-CM

## 2020-11-24 LAB
1,25(OH)2D3 SERPL-MCNC: 58.8 PG/ML (ref 19.9–79.3)
ALBUMIN SERPL-MCNC: 4.2 G/DL (ref 3.5–5.2)
ALBUMIN/GLOB SERPL: 1.7 G/DL
ALP SERPL-CCNC: 55 U/L (ref 39–117)
ALT SERPL-CCNC: 15 U/L (ref 1–33)
AST SERPL-CCNC: 18 U/L (ref 1–32)
BASOPHILS # BLD AUTO: 0.05 10*3/MM3 (ref 0–0.2)
BASOPHILS NFR BLD AUTO: 1 % (ref 0–1.5)
BILIRUB SERPL-MCNC: 0.4 MG/DL (ref 0–1.2)
BUN SERPL-MCNC: 16 MG/DL (ref 6–20)
BUN/CREAT SERPL: 20.8 (ref 7–25)
CALCIUM SERPL-MCNC: 9.2 MG/DL (ref 8.6–10.5)
CHLORIDE SERPL-SCNC: 104 MMOL/L (ref 98–107)
CO2 SERPL-SCNC: 27.3 MMOL/L (ref 22–29)
CREAT SERPL-MCNC: 0.77 MG/DL (ref 0.57–1)
EOSINOPHIL # BLD AUTO: 0.2 10*3/MM3 (ref 0–0.4)
EOSINOPHIL NFR BLD AUTO: 3.9 % (ref 0.3–6.2)
ERYTHROCYTE [DISTWIDTH] IN BLOOD BY AUTOMATED COUNT: 11.8 % (ref 12.3–15.4)
GLOBULIN SER CALC-MCNC: 2.5 GM/DL
GLUCOSE SERPL-MCNC: 83 MG/DL (ref 65–99)
HCT VFR BLD AUTO: 40.2 % (ref 34–46.6)
HGB BLD-MCNC: 13.6 G/DL (ref 12–15.9)
IMM GRANULOCYTES # BLD AUTO: 0.01 10*3/MM3 (ref 0–0.05)
IMM GRANULOCYTES NFR BLD AUTO: 0.2 % (ref 0–0.5)
LYMPHOCYTES # BLD AUTO: 1.65 10*3/MM3 (ref 0.7–3.1)
LYMPHOCYTES NFR BLD AUTO: 32.4 % (ref 19.6–45.3)
MCH RBC QN AUTO: 30.6 PG (ref 26.6–33)
MCHC RBC AUTO-ENTMCNC: 33.8 G/DL (ref 31.5–35.7)
MCV RBC AUTO: 90.3 FL (ref 79–97)
MONOCYTES # BLD AUTO: 0.46 10*3/MM3 (ref 0.1–0.9)
MONOCYTES NFR BLD AUTO: 9 % (ref 5–12)
NEUTROPHILS # BLD AUTO: 2.72 10*3/MM3 (ref 1.7–7)
NEUTROPHILS NFR BLD AUTO: 53.5 % (ref 42.7–76)
NRBC BLD AUTO-RTO: 0 /100 WBC (ref 0–0.2)
PLATELET # BLD AUTO: 277 10*3/MM3 (ref 140–450)
POTASSIUM SERPL-SCNC: 4.8 MMOL/L (ref 3.5–5.2)
PROT SERPL-MCNC: 6.7 G/DL (ref 6–8.5)
RBC # BLD AUTO: 4.45 10*6/MM3 (ref 3.77–5.28)
SODIUM SERPL-SCNC: 142 MMOL/L (ref 136–145)
T3FREE SERPL-MCNC: 2.3 PG/ML (ref 2–4.4)
T4 FREE SERPL-MCNC: 1.64 NG/DL (ref 0.93–1.7)
TSH SERPL DL<=0.005 MIU/L-ACNC: 0.32 UIU/ML (ref 0.27–4.2)
WBC # BLD AUTO: 5.09 10*3/MM3 (ref 3.4–10.8)

## 2020-12-03 ENCOUNTER — OFFICE VISIT (OUTPATIENT)
Dept: ENDOCRINOLOGY | Age: 60
End: 2020-12-03

## 2020-12-03 VITALS
HEART RATE: 96 BPM | HEIGHT: 68 IN | SYSTOLIC BLOOD PRESSURE: 110 MMHG | DIASTOLIC BLOOD PRESSURE: 62 MMHG | OXYGEN SATURATION: 98 % | BODY MASS INDEX: 27.16 KG/M2 | WEIGHT: 179.2 LBS

## 2020-12-03 DIAGNOSIS — C73 PAPILLARY THYROID CARCINOMA (HCC): ICD-10-CM

## 2020-12-03 DIAGNOSIS — E89.0 POSTABLATIVE HYPOTHYROIDISM: Primary | ICD-10-CM

## 2020-12-03 PROCEDURE — 99214 OFFICE O/P EST MOD 30 MIN: CPT | Performed by: INTERNAL MEDICINE

## 2020-12-03 RX ORDER — ERGOCALCIFEROL 1.25 MG/1
CAPSULE ORAL
COMMUNITY
Start: 2020-11-12 | End: 2023-02-28

## 2020-12-03 RX ORDER — LEVOTHYROXINE SODIUM 100 MCG
100 TABLET ORAL DAILY
Qty: 90 TABLET | Refills: 2 | Status: SHIPPED | OUTPATIENT
Start: 2020-12-03 | End: 2021-06-24

## 2020-12-03 RX ORDER — PAROXETINE 7.5 MG/1
1 CAPSULE ORAL DAILY
COMMUNITY
Start: 2020-11-12 | End: 2022-02-22

## 2020-12-03 NOTE — PROGRESS NOTES
60 y.o.    Patient Care Team:  Morris Carrington DO as PCP - General  Morris Carrington DO as PCP - Family Medicine    Chief Complaint:    FOLLOW UP / HYPOTHYROIDISM  FORMER [facility] PATIENT  Subjective     HPI  61 y/o WF is here as a f/u pt for the evaluation of PTC and hypothyroidism.  Patient used to see Dr. Arroyo in the past, transferred her care to me on 12/3/2020.    History of papillary thyroid cancer s/p surgery approximately 18 years ago.  Today in clinic patient reports that she is on Synthroid 100 mcg oral daily.  Compliant with the medication.  She does complain that her energy levels are low, she does have issues with gaining weight, she used to follow opdivo diet and has lost about 20 to 30 pounds on it and when she stopped the diet she started gaining the weight.    No other significant hyper or hypothyroid symptoms.    Hyperlipidemia  Cannot tolerate statins    Vitamin D deficiency  On vitamin D replacement.           Reviewed primary care physician's/consulting physician documentation and lab results       Interval History      The following portions of the patient's history were reviewed and updated as appropriate: allergies, current medications, past family history, past medical history, past social history, past surgical history and problem list.    Past Medical History:   Diagnosis Date   • Anxiety and depression    • Hypothyroidism    • Raynaud's syndrome    • Thyroid cancer (CMS/HCC)      Family History   Problem Relation Age of Onset   • Hypertension Mother    • Colon cancer Father    • Thyroid disease Father      Social History     Socioeconomic History   • Marital status:      Spouse name: Not on file   • Number of children: Not on file   • Years of education: Not on file   • Highest education level: Not on file   Tobacco Use   • Smoking status: Never Smoker   • Smokeless tobacco: Never Used   Substance and Sexual Activity   • Alcohol use: No   • Drug use: No     Allergies    Allergen Reactions   • Ceftriaxone Unknown (See Comments)     rash  rash  rash  rash   • Codeine Nausea And Vomiting   • Ibuprofen      PALPATIONS   • Statins      JOINT PAIN       Current Outpatient Medications:   •  B COMPLEX-C ER PO, Take  by mouth., Disp: , Rfl:   •  calcium carb-cholecalciferol 600-800 MG-UNIT tablet, Take 1 tablet by mouth., Disp: , Rfl:   •  Coenzyme Q10 (COQ10) 200 MG capsule, Take  by mouth., Disp: , Rfl:   •  FLUZONE QUADRIVALENT suspension IM suspension, See Admin Instructions., Disp: , Rfl: 0  •  HAVRIX 1440 EL U/ML vaccine, See Admin Instructions., Disp: , Rfl: 1  •  Homeopathic Products (PROSACEA) gel, Apply  topically to the appropriate area as directed., Disp: , Rfl:   •  KETOTIFEN FUMARATE OP, Apply 1 drop to eye(s) as directed by provider., Disp: , Rfl:   •  mometasone (NASONEX) 50 MCG/ACT nasal spray, , Disp: , Rfl:   •  PARoxetine Mesylate 7.5 MG capsule, Take 1 capsule by mouth Daily., Disp: , Rfl:   •  Synthroid 100 MCG tablet, Take 1 tablet by mouth Daily., Disp: 90 tablet, Rfl: 2  •  vitamin D (ERGOCALCIFEROL) 1.25 MG (72162 UT) capsule capsule, TAKE ONE CAPSULE BY MOUTH ONCE WEEKLY FOR 12 WEEKS., Disp: , Rfl:   •  Cholecalciferol (VITAMIN D3) 2000 units capsule, Take  by mouth., Disp: , Rfl:   •  naproxen sodium (ALEVE) 220 MG tablet, Take 220 mg by mouth 2 (Two) Times a Day As Needed for mild pain (1-3)., Disp: , Rfl:         Review of Systems   Constitutional: Positive for fatigue. Negative for appetite change and fever.   Eyes: Negative for visual disturbance.   Respiratory: Negative for shortness of breath.    Cardiovascular: Negative for palpitations and leg swelling.   Gastrointestinal: Negative for abdominal pain and vomiting.   Endocrine: Negative for polydipsia and polyuria.   Musculoskeletal: Negative for joint swelling and neck pain.   Skin: Negative for rash.   Neurological: Negative for weakness and numbness.   Psychiatric/Behavioral: Negative for behavioral  "problems.     I have reviewed and confirmed the accuracy of the ROS as documented by the MA/LPN/RN Trent Ulloa MD      Objective       Vitals:    12/03/20 0850   BP: 110/62   Pulse: 96   SpO2: 98%   Weight: 81.3 kg (179 lb 3.2 oz)   Height: 172.7 cm (68\")     Body mass index is 27.25 kg/m².      Physical Exam  Vitals signs reviewed.   Constitutional:       Appearance: Normal appearance. She is not diaphoretic.   HENT:      Head: Normocephalic and atraumatic.   Eyes:      General: No scleral icterus.     Conjunctiva/sclera: Conjunctivae normal.   Neck:      Musculoskeletal: Normal range of motion and neck supple.      Thyroid: No thyromegaly.   Cardiovascular:      Rate and Rhythm: Normal rate.   Pulmonary:      Effort: No respiratory distress.   Abdominal:      General: There is no distension.      Palpations: Abdomen is soft.   Musculoskeletal:         General: No tenderness or deformity.   Skin:     General: Skin is warm and dry.   Neurological:      Mental Status: She is alert and oriented to person, place, and time. Mental status is at baseline.      Gait: Gait normal.   Psychiatric:         Mood and Affect: Mood normal.         Behavior: Behavior normal.         Results Review:     I reviewed the patient's new clinical results and mentioned them above in HPI and in plan as well.    Medical records reviewed  Summary:Done      Orders Only on 11/19/2020   Component Date Value Ref Range Status   • WBC 11/19/2020 5.09  3.40 - 10.80 10*3/mm3 Final   • RBC 11/19/2020 4.45  3.77 - 5.28 10*6/mm3 Final   • Hemoglobin 11/19/2020 13.6  12.0 - 15.9 g/dL Final   • Hematocrit 11/19/2020 40.2  34.0 - 46.6 % Final   • MCV 11/19/2020 90.3  79.0 - 97.0 fL Final   • MCH 11/19/2020 30.6  26.6 - 33.0 pg Final   • MCHC 11/19/2020 33.8  31.5 - 35.7 g/dL Final   • RDW 11/19/2020 11.8* 12.3 - 15.4 % Final   • Platelets 11/19/2020 277  140 - 450 10*3/mm3 Final   • Neutrophil Rel % 11/19/2020 53.5  42.7 - 76.0 % Final   • Lymphocyte " Rel % 11/19/2020 32.4  19.6 - 45.3 % Final   • Monocyte Rel % 11/19/2020 9.0  5.0 - 12.0 % Final   • Eosinophil Rel % 11/19/2020 3.9  0.3 - 6.2 % Final   • Basophil Rel % 11/19/2020 1.0  0.0 - 1.5 % Final   • Neutrophils Absolute 11/19/2020 2.72  1.70 - 7.00 10*3/mm3 Final   • Lymphocytes Absolute 11/19/2020 1.65  0.70 - 3.10 10*3/mm3 Final   • Monocytes Absolute 11/19/2020 0.46  0.10 - 0.90 10*3/mm3 Final   • Eosinophils Absolute 11/19/2020 0.20  0.00 - 0.40 10*3/mm3 Final   • Basophils Absolute 11/19/2020 0.05  0.00 - 0.20 10*3/mm3 Final   • Immature Granulocyte Rel % 11/19/2020 0.2  0.0 - 0.5 % Final   • Immature Grans Absolute 11/19/2020 0.01  0.00 - 0.05 10*3/mm3 Final   • nRBC 11/19/2020 0.0  0.0 - 0.2 /100 WBC Final   • Glucose 11/19/2020 83  65 - 99 mg/dL Final   • BUN 11/19/2020 16  6 - 20 mg/dL Final   • Creatinine 11/19/2020 0.77  0.57 - 1.00 mg/dL Final   • eGFR Non  Am 11/19/2020 77  >60 mL/min/1.73 Final   • eGFR African Am 11/19/2020 93  >60 mL/min/1.73 Final   • BUN/Creatinine Ratio 11/19/2020 20.8  7.0 - 25.0 Final   • Sodium 11/19/2020 142  136 - 145 mmol/L Final   • Potassium 11/19/2020 4.8  3.5 - 5.2 mmol/L Final   • Chloride 11/19/2020 104  98 - 107 mmol/L Final   • Total CO2 11/19/2020 27.3  22.0 - 29.0 mmol/L Final   • Calcium 11/19/2020 9.2  8.6 - 10.5 mg/dL Final   • Total Protein 11/19/2020 6.7  6.0 - 8.5 g/dL Final   • Albumin 11/19/2020 4.20  3.50 - 5.20 g/dL Final   • Globulin 11/19/2020 2.5  gm/dL Final   • A/G Ratio 11/19/2020 1.7  g/dL Final   • Total Bilirubin 11/19/2020 0.4  0.0 - 1.2 mg/dL Final   • Alkaline Phosphatase 11/19/2020 55  39 - 117 U/L Final   • AST (SGOT) 11/19/2020 18  1 - 32 U/L Final   • ALT (SGPT) 11/19/2020 15  1 - 33 U/L Final   • Free T4 11/19/2020 1.64  0.93 - 1.70 ng/dL Final    Results may be falsely increased if patient taking Biotin.   • TSH 11/19/2020 0.319  0.270 - 4.200 uIU/mL Final   • 1,25-Dihydroxy, Vitamin D 11/19/2020 58.8  19.9 - 79.3 pg/mL  "Final   • T3, Free 11/19/2020 2.3  2.0 - 4.4 pg/mL Final     Lab Results   Component Value Date    HGBA1C 5.2 09/11/2020    HGBA1C 4.9 02/04/2020    HGBA1C 5.6 09/24/2019     Lab Results   Component Value Date    CREATININE 0.77 11/19/2020     Imaging Results (Most Recent)     None                Assessment and Plan:    Diagnoses and all orders for this visit:    1. Postablative hypothyroidism (Primary)  -     TSH; Future  -     T4, Free; Future  -     T3, Free; Future  -     Comprehensive Metabolic Panel; Future  -     Hemoglobin A1c; Future  -     Vitamin B12 & Folate; Future  -     Vitamin D 25 Hydroxy; Future    2. Papillary thyroid carcinoma (CMS/HCC)  -     TSH; Future  -     T4, Free; Future  -     T3, Free; Future  -     Comprehensive Metabolic Panel; Future  -     Hemoglobin A1c; Future  -     Vitamin B12 & Folate; Future  -     Vitamin D 25 Hydroxy; Future    Other orders  -     Synthroid 100 MCG tablet; Take 1 tablet by mouth Daily.  Dispense: 90 tablet; Refill: 2      Hypothyroidism  Continue Synthroid 100 mcg oral daily    History of papillary thyroid cancer  More than 10 years ago, chance of recurrence is low.  Explained to the patient that there is no need to suppress the TSH levels.    Vitamin D deficiency  Continue vitamin D replacement.    All the above problems are new for me.    Reviewed Lab results with the patient.               Trent Ulloa MD  12/03/20    EMR Dragon / transcription disclaimer:     \"Dictated utilizing Dragon dictation\".      "

## 2021-01-04 ENCOUNTER — TELEPHONE (OUTPATIENT)
Dept: ENDOCRINOLOGY | Age: 61
End: 2021-01-04

## 2021-03-24 ENCOUNTER — BULK ORDERING (OUTPATIENT)
Dept: CASE MANAGEMENT | Facility: OTHER | Age: 61
End: 2021-03-24

## 2021-03-24 DIAGNOSIS — Z23 IMMUNIZATION DUE: ICD-10-CM

## 2021-06-24 ENCOUNTER — OFFICE VISIT (OUTPATIENT)
Dept: ENDOCRINOLOGY | Age: 61
End: 2021-06-24

## 2021-06-24 VITALS
DIASTOLIC BLOOD PRESSURE: 74 MMHG | SYSTOLIC BLOOD PRESSURE: 120 MMHG | BODY MASS INDEX: 28.37 KG/M2 | HEART RATE: 68 BPM | HEIGHT: 68 IN | WEIGHT: 187.2 LBS | OXYGEN SATURATION: 97 %

## 2021-06-24 DIAGNOSIS — E89.0 POSTABLATIVE HYPOTHYROIDISM: Primary | ICD-10-CM

## 2021-06-24 DIAGNOSIS — R53.82 CHRONIC FATIGUE: ICD-10-CM

## 2021-06-24 DIAGNOSIS — C73 PAPILLARY THYROID CARCINOMA (HCC): ICD-10-CM

## 2021-06-24 PROCEDURE — 99214 OFFICE O/P EST MOD 30 MIN: CPT | Performed by: INTERNAL MEDICINE

## 2021-06-24 RX ORDER — MAGNESIUM 30 MG
30 TABLET ORAL 2 TIMES DAILY
COMMUNITY

## 2021-06-24 RX ORDER — LEVOTHYROXINE SODIUM 88 MCG
TABLET ORAL
Qty: 30 TABLET | Refills: 11 | Status: SHIPPED | OUTPATIENT
Start: 2021-06-24 | End: 2022-04-22 | Stop reason: SDUPTHER

## 2021-06-24 RX ORDER — MULTIVIT WITH MINERALS/LUTEIN
250 TABLET ORAL DAILY
COMMUNITY

## 2021-06-24 RX ORDER — LEVOTHYROXINE SODIUM 88 MCG
TABLET ORAL
Qty: 30 TABLET | Refills: 11 | Status: SHIPPED | OUTPATIENT
Start: 2021-06-24 | End: 2021-06-24

## 2021-06-24 RX ORDER — TURMERIC 400 MG
CAPSULE ORAL
COMMUNITY
End: 2023-02-28

## 2021-06-24 RX ORDER — LEVOTHYROXINE SODIUM 100 MCG
TABLET ORAL
Qty: 30 TABLET | Refills: 11 | Status: SHIPPED | OUTPATIENT
Start: 2021-06-24 | End: 2021-07-20

## 2021-06-24 RX ORDER — LEVOTHYROXINE SODIUM 100 MCG
TABLET ORAL
Qty: 30 TABLET | Refills: 11 | Status: SHIPPED | OUTPATIENT
Start: 2021-06-24 | End: 2021-06-24

## 2021-06-24 NOTE — PROGRESS NOTES
"Chief Complaint  Chief Complaint   Patient presents with   • Hypothyroidism   FOLLOW UP/ HYPOTHYROIDISM      Subjective          History of Present Illness    Tiera Siegel 61 y.o. presents as a f/u pt for the evaluation of PTC and hypothyroidism.  Patient used to see Dr. Arroyo in the past, transferred her care to me on 12/3/2020.     History of papillary thyroid cancer s/p surgery approximately 18 years ago.  Today in clinic pt reports that she is on synthroid 100 mcg oral daily. Compliant with the medication.  She does complain that her energy levels are low.  She has to work hard to lose the weight, and even after following dietary restrictions she has been having difficulty to lose weight.  No other significant hyper or hypothyroid symptoms.       Hyperlipidemia  Cannot tolerate statins     Vitamin D deficiency  On vitamin D replacement.    Reviewed primary care physician's/consulting physician documentation and lab results         I have reviewed the patient's allergies, medicines, past medical hx, family hx and social hx in detail.    Objective   Vital Signs:   /74   Pulse 68   Ht 172.7 cm (68\")   Wt 84.9 kg (187 lb 3.2 oz)   SpO2 97%   BMI 28.46 kg/m²   Physical Exam   General appearance - no distress  Eyes- anicteric sclera  Ear nose and throat-external ears and nose normal.    Respiratory-normal chest on inspection.  No respiratory distress noted.  Skin-no rashes.  Neuro-alert and oriented x3            Result Review :   The following data was reviewed by: Trent Ulloa MD on 06/24/2021:  Results Encounter on 06/01/2021   Component Date Value Ref Range Status   • TSH 06/10/2021 0.163* 0.270 - 4.200 uIU/mL Final   • Free T4 06/10/2021 1.34  0.93 - 1.70 ng/dL Final    Results may be falsely increased if patient taking Biotin.   • T3, Free 06/10/2021 2.4  2.0 - 4.4 pg/mL Final   • Glucose 06/10/2021 86  65 - 99 mg/dL Final   • BUN 06/10/2021 23* 6 - 20 mg/dL Final   • Creatinine 06/10/2021 " 0.70  0.57 - 1.00 mg/dL Final   • eGFR Non  Am 06/10/2021 86  >60 mL/min/1.73 Final    Comment: GFR Normal >60  Chronic Kidney Disease <60  Kidney Failure <15     • eGFR  Am 06/10/2021 104  >60 mL/min/1.73 Final   • BUN/Creatinine Ratio 06/10/2021 32.9* 7.0 - 25.0 Final   • Sodium 06/10/2021 144  136 - 145 mmol/L Final   • Potassium 06/10/2021 4.4  3.5 - 5.2 mmol/L Final   • Chloride 06/10/2021 105  98 - 107 mmol/L Final   • Total CO2 06/10/2021 29.1* 22.0 - 29.0 mmol/L Final   • Calcium 06/10/2021 9.0  8.6 - 10.5 mg/dL Final   • Total Protein 06/10/2021 6.8  6.0 - 8.5 g/dL Final   • Albumin 06/10/2021 4.30  3.50 - 5.20 g/dL Final   • Globulin 06/10/2021 2.5  gm/dL Final   • A/G Ratio 06/10/2021 1.7  g/dL Final   • Total Bilirubin 06/10/2021 0.2  0.0 - 1.2 mg/dL Final   • Alkaline Phosphatase 06/10/2021 82  39 - 117 U/L Final   • AST (SGOT) 06/10/2021 20  1 - 32 U/L Final   • ALT (SGPT) 06/10/2021 16  1 - 33 U/L Final   • Hemoglobin A1C 06/10/2021 5.70* 4.80 - 5.60 % Final    Comment: Hemoglobin A1C Ranges:  Increased Risk for Diabetes  5.7% to 6.4%  Diabetes                     >= 6.5%  Diabetic Goal                < 7.0%     • Vitamin B-12 06/10/2021 441  211 - 946 pg/mL Final    Results may be falsely increased if patient taking Biotin.   • Folate 06/10/2021 7.49  4.78 - 24.20 ng/mL Final    Results may be falsely increased if patient taking Biotin.   • 25 Hydroxy, Vitamin D 06/10/2021 48.4  30.0 - 100.0 ng/ml Final    Comment: Results may be falsely increased if patient taking Biotin.  Reference Range for Total Vitamin D 25(OH)  Deficiency <20.0 ng/mL  Insufficiency 21-29 ng/mL  Sufficiency  ng/mL  Toxicity >100 ng/ml       Data reviewed: PCP documentation       Results Review:    I reviewed the patient's new clinical results.     Assessment and Plan    Problem List Items Addressed This Visit        Other    Papillary thyroid carcinoma (CMS/HCC)    Relevant Medications    Synthroid 100 MCG  "tablet    Synthroid 88 MCG tablet    Other Relevant Orders    TSH    T4, Free    T3, Free    Basic Metabolic Panel    Hemoglobin A1c    Vitamin B12 & Folate    Vitamin D 25 Hydroxy    Lipid Panel    TSH    T4, Free    T3, Free    Chronic fatigue    Relevant Orders    TSH    T4, Free    T3, Free    Basic Metabolic Panel    Hemoglobin A1c    Vitamin B12 & Folate    Vitamin D 25 Hydroxy    Lipid Panel    TSH    T4, Free    T3, Free      Other Visit Diagnoses     Postablative hypothyroidism    -  Primary    Relevant Medications    Synthroid 100 MCG tablet    Synthroid 88 MCG tablet    Other Relevant Orders    TSH    T4, Free    T3, Free    Basic Metabolic Panel    Hemoglobin A1c    Vitamin B12 & Folate    Vitamin D 25 Hydroxy    Lipid Panel    TSH    T4, Free    T3, Free        Hypothyroidism-levels are not stable  Change Synthroid 200 mcg 5 days a week  Change Synthroid to 88 mcg 2 days a week.  Repeat the blood work-up in 2 to 3 months from now.    Prediabetes  Discussed about diet control, exercise regimen for the glycemic control.    Vitamin D deficiency  Continue vitamin D replacement.  Interpreted the blood work-up/imaging results performed by the primary care/consulting physician -    Refills sent to pharmacy    Follow Up     Patient was given instructions and counseling regarding her condition or for health maintenance advice. Please see specific information pulled into the AVS if appropriate.       Thank you for asking me to see your patient, Tiera Siegel in consultation.         Trent Ulloa MD  06/24/21      EMR Dragon / transcription disclaimer:     \"Dictated utilizing Dragon dictation\".              "

## 2021-06-28 ENCOUNTER — PRIOR AUTHORIZATION (OUTPATIENT)
Dept: ENDOCRINOLOGY | Age: 61
End: 2021-06-28

## 2021-07-20 RX ORDER — LEVOTHYROXINE SODIUM 100 MCG
TABLET ORAL
Qty: 90 TABLET | Refills: 3 | Status: SHIPPED | OUTPATIENT
Start: 2021-07-20 | End: 2022-04-22

## 2021-09-24 ENCOUNTER — LAB (OUTPATIENT)
Dept: ENDOCRINOLOGY | Age: 61
End: 2021-09-24

## 2021-09-24 DIAGNOSIS — R53.82 CHRONIC FATIGUE: ICD-10-CM

## 2021-09-24 DIAGNOSIS — E89.0 POSTABLATIVE HYPOTHYROIDISM: ICD-10-CM

## 2021-09-24 DIAGNOSIS — C73 PAPILLARY THYROID CARCINOMA (HCC): ICD-10-CM

## 2021-09-25 LAB
25(OH)D3+25(OH)D2 SERPL-MCNC: 42.3 NG/ML (ref 30–100)
BUN SERPL-MCNC: 11 MG/DL (ref 6–20)
BUN/CREAT SERPL: 14.7 (ref 7–25)
CALCIUM SERPL-MCNC: 9.4 MG/DL (ref 8.6–10.5)
CHLORIDE SERPL-SCNC: 106 MMOL/L (ref 98–107)
CHOLEST SERPL-MCNC: 259 MG/DL (ref 0–200)
CO2 SERPL-SCNC: 25.5 MMOL/L (ref 22–29)
CREAT SERPL-MCNC: 0.75 MG/DL (ref 0.57–1)
FOLATE SERPL-MCNC: >20 NG/ML (ref 4.78–24.2)
GLUCOSE SERPL-MCNC: 92 MG/DL (ref 65–99)
HBA1C MFR BLD: 5.5 % (ref 4.8–5.6)
HDLC SERPL-MCNC: 92 MG/DL (ref 40–60)
IMP & REVIEW OF LAB RESULTS: NORMAL
LDLC SERPL CALC-MCNC: 156 MG/DL (ref 0–100)
POTASSIUM SERPL-SCNC: 4.6 MMOL/L (ref 3.5–5.2)
SODIUM SERPL-SCNC: 143 MMOL/L (ref 136–145)
T3FREE SERPL-MCNC: 2.7 PG/ML (ref 2–4.4)
T4 FREE SERPL-MCNC: 1.61 NG/DL (ref 0.93–1.7)
TRIGL SERPL-MCNC: 68 MG/DL (ref 0–150)
TSH SERPL DL<=0.005 MIU/L-ACNC: 0.24 UIU/ML (ref 0.27–4.2)
VIT B12 SERPL-MCNC: 847 PG/ML (ref 211–946)
VLDLC SERPL CALC-MCNC: 11 MG/DL (ref 5–40)

## 2022-02-22 ENCOUNTER — PATIENT ROUNDING (BHMG ONLY) (OUTPATIENT)
Dept: OBSTETRICS AND GYNECOLOGY | Age: 62
End: 2022-02-22

## 2022-02-22 ENCOUNTER — OFFICE VISIT (OUTPATIENT)
Dept: OBSTETRICS AND GYNECOLOGY | Age: 62
End: 2022-02-22

## 2022-02-22 VITALS
WEIGHT: 189.2 LBS | HEIGHT: 68 IN | SYSTOLIC BLOOD PRESSURE: 110 MMHG | BODY MASS INDEX: 28.67 KG/M2 | DIASTOLIC BLOOD PRESSURE: 72 MMHG

## 2022-02-22 DIAGNOSIS — Z11.51 SCREENING FOR HUMAN PAPILLOMAVIRUS: ICD-10-CM

## 2022-02-22 DIAGNOSIS — N95.1 HOT FLASH, MENOPAUSAL: ICD-10-CM

## 2022-02-22 DIAGNOSIS — Z12.31 SCREENING MAMMOGRAM FOR BREAST CANCER: ICD-10-CM

## 2022-02-22 DIAGNOSIS — Z80.0 FAMILY HISTORY OF COLON CANCER IN FATHER: ICD-10-CM

## 2022-02-22 DIAGNOSIS — Z01.419 ENCOUNTER FOR GYNECOLOGICAL EXAMINATION WITHOUT ABNORMAL FINDING: ICD-10-CM

## 2022-02-22 DIAGNOSIS — Z90.711 S/P LAPAROSCOPIC SUPRACERVICAL HYSTERECTOMY: ICD-10-CM

## 2022-02-22 DIAGNOSIS — E89.40 SURGICAL MENOPAUSE: ICD-10-CM

## 2022-02-22 PROCEDURE — 99386 PREV VISIT NEW AGE 40-64: CPT | Performed by: OBSTETRICS & GYNECOLOGY

## 2022-02-22 PROCEDURE — 99213 OFFICE O/P EST LOW 20 MIN: CPT | Performed by: OBSTETRICS & GYNECOLOGY

## 2022-02-22 RX ORDER — PAROXETINE 10 MG/1
10 TABLET, FILM COATED ORAL DAILY
Qty: 90 TABLET | Refills: 3 | Status: SHIPPED | OUTPATIENT
Start: 2022-02-22 | End: 2023-02-28

## 2022-02-22 NOTE — PROGRESS NOTES
Routine Annual Visit    2022    Patient: Tiera Siegel          MR#:[id number]      Chief Complaint   Patient presents with   • Gynecologic Exam     New pt, last a[facility], MG last year, dexa 2015, Colonoscopy due this year.   • Establish Care       History of Present Illness    61 y.o. female  who presents for annual exam. She is doing well, on paxil for hot flashes and it does help. Was told not to take HRT by endocrinology, hx of papillary thyroid cancer.    She has a history of supracervical hysterectomy, performed for abnormal bleeding.  She was noted to have endometriosis at the time of surgery and she had a supracervical hysterectomy, bilateral salpingo-oophorectomy.  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 Winchester at [place]. Performs every 5 yrs.  Family history of Breast, ovarian, uterine, colon, pancreatic cancer: yes - father colon, mat aunt breast  DEXA:   PCP Dr. Carrington    No LMP recorded. Patient has had a hysterectomy.  Obstetric History:  OB History        0    Para   0    Term   0       0    AB   0    Living   0       SAB   0    IAB   0    Ectopic   0    Molar   0    Multiple   0    Live Births   0               Menstrual History:     No LMP recorded. Patient has had a hysterectomy.       ________________________________________  Patient Active Problem List   Diagnosis   • Cancer of thyroid (HCC)   • Papillary thyroid carcinoma (HCC)   • Post-operative hypothyroidism   • Abnormal weight   • Chronic fatigue   • Vitamin D deficiency   • Rash  "      Past Medical History:   Diagnosis Date   • Anxiety and depression    • Hypothyroidism    • Raynaud's syndrome    • Thyroid cancer (HCC)        Family History   Problem Relation Age of Onset   • Hypertension Mother    • Colon cancer Father    • Thyroid disease Father    • Breast cancer Maternal Aunt        Past Surgical History:   Procedure Laterality Date   • LIPOMA EXCISION      ABDOMAN   • TOTAL THYROIDECTOMY         Social History     Tobacco Use   Smoking Status Never Smoker   Smokeless Tobacco Never Used       has a current medication list which includes the following prescription(s): b complex-c, calcium carb-cholecalciferol, vitamin d3, fluzone quadrivalent, havrix, prosacea, ketotifen fumarate, magnesium, mometasone, naproxen sodium, synthroid, synthroid, turmeric, vitamin c, vitamin d, coq10, and paroxetine.  ________________________________________      Review of Systems   Constitutional: Negative for appetite change and fever.   Respiratory: Negative for shortness of breath.    Cardiovascular: Negative for chest pain and palpitations.   Gastrointestinal: Negative for diarrhea, nausea and vomiting.   Genitourinary: Negative for vaginal bleeding.   Neurological: Negative for dizziness and headaches.       Objective   Physical Exam    /72   Ht 172.7 cm (68\")   Wt 85.8 kg (189 lb 3.2 oz)   Breastfeeding No   BMI 28.77 kg/m²    BP Readings from Last 3 Encounters:   02/22/22 110/72   06/24/21 120/74   12/03/20 110/62      Wt Readings from Last 3 Encounters:   02/22/22 85.8 kg (189 lb 3.2 oz)   06/24/21 84.9 kg (187 lb 3.2 oz)   12/03/20 81.3 kg (179 lb 3.2 oz)         BMI: Body mass index is 28.77 kg/m².       General:   alert, appears stated age and cooperative   Neck: No thyromegaly or LAD, no carotid bruit noted   Heart:: regular rate and rhythm, S1, S2 normal, no murmur, click, rub or gallop   Lungs: normal respiratory effort and auscultation   Abdomen: soft, non-tender, without masses or " organomegaly   Breast: inspection negative, no nipple discharge or bleeding, no masses or nodularity palpable   Urethra and bladder: urethral meatus normal; bladder nontender to palpation;   Vulva: normal, Bartholin's, Urethra, [place]'s normal   Vagina: normal mucosa, normal discharge   Cervix: no lesions and nulliparous appearance   Uterus: absent    Adnexa: normal adnexa and no mass, fullness, tenderness       Assessment:    normal annual exam   History of supracervical hysterectomy  Hot flashes    Plan:    Plan     [x]  Mammogram request made  [x]  PAP done  []  Labs:   []  GC/Chl/TV  []  DEXA scan   []  Referral for colonoscopy: plans to call Dr. Winchester at [place]    Request records from prior GYN  Discussed increasing the Paxil slightly as she still has some hot flashes, this was sent to the pharmacy today.    Counseling  [x]  Nutrition  [x]  Physical activity/regular exercise   [x]  Healthy weight  []  Injury prevention  []  Smoking cessation  []  Substance misuse/abuse  [x]  Sexual behavior  []  STD prevention  []  Contraception  []  Dental health  []  Mental health  []  Immunization  [x]  Encouraged SBE      Ankita Gee MD  02/22/2022  09:21 EST

## 2022-02-25 LAB
CYTOLOGIST CVX/VAG CYTO: NORMAL
CYTOLOGY CVX/VAG DOC CYTO: NORMAL
CYTOLOGY CVX/VAG DOC THIN PREP: NORMAL
DX ICD CODE: NORMAL
HIV 1 & 2 AB SER-IMP: NORMAL
HPV I/H RISK 4 DNA CVX QL PROBE+SIG AMP: NEGATIVE
OTHER STN SPEC: NORMAL
STAT OF ADQ CVX/VAG CYTO-IMP: NORMAL

## 2022-03-10 ENCOUNTER — OFFICE VISIT (OUTPATIENT)
Dept: ENDOCRINOLOGY | Age: 62
End: 2022-03-10

## 2022-03-10 VITALS
HEART RATE: 70 BPM | WEIGHT: 185.4 LBS | HEIGHT: 68 IN | BODY MASS INDEX: 28.1 KG/M2 | OXYGEN SATURATION: 98 % | DIASTOLIC BLOOD PRESSURE: 77 MMHG | SYSTOLIC BLOOD PRESSURE: 126 MMHG

## 2022-03-10 DIAGNOSIS — C73 PAPILLARY THYROID CARCINOMA: ICD-10-CM

## 2022-03-10 DIAGNOSIS — E55.9 VITAMIN D DEFICIENCY: ICD-10-CM

## 2022-03-10 DIAGNOSIS — E89.0 POSTABLATIVE HYPOTHYROIDISM: Primary | ICD-10-CM

## 2022-03-10 PROCEDURE — 99214 OFFICE O/P EST MOD 30 MIN: CPT | Performed by: INTERNAL MEDICINE

## 2022-03-10 NOTE — PROGRESS NOTES
"Chief Complaint  Chief Complaint   Patient presents with   • Hypothyroidism       Subjective          History of Present Illness    Tiera Siegel 61 y.o. presents as a  F/u patient for the evaluation of Hypothyroidism.     Today in clinic pt reports that she is on synthroid 100 mcg oral daily for 5 days a week and 88 mcg oral daily - 2 days a week.       History of papillary thyroid cancer s/p surgery approximately 18 years ago.  She does report that her energy levels are good today.  Under a lot of stress.  Weight has been stable.  No other significant hyper or hypothyroid symptoms.        Hyperlipidemia  Cannot tolerate statins     Vitamin D deficiency  On vitamin D replacement.     #Recently had covid, sinus infection and has been on frequent antibiotics for her sinus infections.    Reviewed primary care physician's/consulting physician documentation and lab results         I have reviewed the patient's allergies, medicines, past medical hx, family hx and social hx in detail.    Objective   Vital Signs:   /77   Pulse 70   Ht 172.7 cm (68\")   Wt 84.1 kg (185 lb 6.4 oz)   SpO2 98%   BMI 28.19 kg/m²   Physical Exam   General appearance - no distress  Eyes- anicteric sclera  Ear nose and throat-external ears and nose normal.    Respiratory-normal chest on inspection.  No respiratory distress noted.  Skin-no rashes.  Neuro-alert and oriented x3          Result Review :   The following data was reviewed by: Trent Ulloa MD on 03/10/2022:  Office Visit on 02/22/2022   Component Date Value Ref Range Status   • Diagnosis 02/22/2022 Comment   Final    Comment: NEGATIVE FOR INTRAEPITHELIAL LESION OR MALIGNANCY.  CELLULAR CHANGES ASSOCIATED WITH ATROPHY ARE PRESENT.     • Specimen adequacy: 02/22/2022 Comment   Final    Comment: Satisfactory for evaluation.  Endocervical component may not be  distinguished in cases of atrophy.     • Clinician Provided ICD-10: 02/22/2022 Comment   Final    Comment: " Z01.419  Z11.51     • Performed by: 02/22/2022 Comment   Final    Stephanie Orta, Cytotechnologist (ASCP)   • . 02/22/2022 .   Final   • Note: 02/22/2022 Comment   Final    Comment: The Pap smear is a screening test designed to aid in the detection of  premalignant and malignant conditions of the uterine cervix.  It is not a  diagnostic procedure and should not be used as the sole means of detecting  cervical cancer.  Both false-positive and false-negative reports do occur.     • Method: 02/22/2022 Comment   Final    Comment: This liquid based ThinPrep(R) pap test was screened with the  use of an image guided system.     • HPV Aptima 02/22/2022 Negative  Negative Final    Comment: This nucleic acid amplification test detects fourteen high-risk  HPV types (16,18,31,33,35,39,45,51,52,56,58,59,66,68) without  differentiation.       Data reviewed: PCP notes       Results Review:    I reviewed the patient's new clinical results.     Assessment and Plan    Problem List Items Addressed This Visit        Other    Papillary thyroid carcinoma (HCC)    Relevant Orders    T3, Free    T4, Free    TSH    Hemoglobin A1c    TSH    T4, Free    T3, Free    Basic Metabolic Panel    Hemoglobin A1c    Lipid Panel    Vitamin D 25 Hydroxy    Vitamin B12 & Folate    Vitamin D deficiency    Relevant Orders    T3, Free    T4, Free    TSH    Hemoglobin A1c    TSH    T4, Free    T3, Free    Basic Metabolic Panel    Hemoglobin A1c    Lipid Panel    Vitamin D 25 Hydroxy    Vitamin B12 & Folate      Other Visit Diagnoses     Postablative hypothyroidism    -  Primary    Relevant Orders    T3, Free    T4, Free    TSH    Hemoglobin A1c    TSH    T4, Free    T3, Free    Basic Metabolic Panel    Hemoglobin A1c    Lipid Panel    Vitamin D 25 Hydroxy    Vitamin B12 & Folate        Hypothyroidism-postoperative  Continue Synthroid 100 mcg for 5 days a week and 88 mcg for 2 days a week.  Not sure if the thyroid abnormality secondary to  "nonthyroidal illness.  Repeat work-up in 6 to 8 weeks from now.  If the levels have improved continue the current regimen.  If the levels are worse might change Synthroid to 88 for 5 days a week and 100 for 2 days a week.    Osteopenia  Continue calcium and vitamin D replacement.  Patient would let us know if she wants to proceed with a bone density scan through us.    Prediabetes  Emphasized on diet and exercise.  Interpreted the blood work-up/imaging results performed by the primary care/consulting physician -    Refills sent to pharmacy    Follow Up     Patient was given instructions and counseling regarding her condition or for health maintenance advice. Please see specific information pulled into the AVS if appropriate.       Thank you for asking me to see your patient, Tiera Siegel in consultation.         Trent Ulloa MD  03/10/22      EMR Dragon / transcription disclaimer:     \"Dictated utilizing Dragon dictation\".              "

## 2022-03-17 ENCOUNTER — PATIENT ROUNDING (BHMG ONLY) (OUTPATIENT)
Dept: OBSTETRICS AND GYNECOLOGY | Age: 62
End: 2022-03-17

## 2022-03-17 NOTE — PROGRESS NOTES
March 17, 2022    Hello, may I speak with Tiera Siegel?    My name is Maris Headley        I am  with [facility]  [address]   [facility] [address] [phone].    Before we get started may I verify your date of birth? 1960    I am calling to officially welcome you to our practice and ask about your recent visit. Is this a good time to talk? No Voicemail left    Tell me about your visit with us. What things went well?         We're always looking for ways to make our patients' experiences even better. Do you have recommendations on ways we may improve?      Overall were you satisfied with your first visit to our practice?        I appreciate you taking the time to speak with me today. Is there anything else I can do for you?      Thank you, and have a great day.

## 2022-03-24 ENCOUNTER — HOSPITAL ENCOUNTER (OUTPATIENT)
Dept: MAMMOGRAPHY | Facility: HOSPITAL | Age: 62
Discharge: HOME | End: 2022-03-24
Admitting: OBSTETRICS & GYNECOLOGY

## 2022-03-24 DIAGNOSIS — Z12.31 SCREENING MAMMOGRAM FOR BREAST CANCER: ICD-10-CM

## 2022-03-24 PROCEDURE — 77063 BREAST TOMOSYNTHESIS BI: CPT

## 2022-03-24 PROCEDURE — 77067 SCR MAMMO BI INCL CAD: CPT

## 2022-04-21 ENCOUNTER — LAB (OUTPATIENT)
Dept: ENDOCRINOLOGY | Age: 62
End: 2022-04-21

## 2022-04-21 DIAGNOSIS — E55.9 VITAMIN D DEFICIENCY: ICD-10-CM

## 2022-04-21 DIAGNOSIS — E89.0 POSTABLATIVE HYPOTHYROIDISM: ICD-10-CM

## 2022-04-21 DIAGNOSIS — C73 PAPILLARY THYROID CARCINOMA: ICD-10-CM

## 2022-04-22 LAB
25(OH)D3+25(OH)D2 SERPL-MCNC: 51.4 NG/ML (ref 30–100)
BUN SERPL-MCNC: 15 MG/DL (ref 6–24)
BUN/CREAT SERPL: 19 (ref 9–23)
CALCIUM SERPL-MCNC: 9.3 MG/DL (ref 8.7–10.2)
CHLORIDE SERPL-SCNC: 105 MMOL/L (ref 96–106)
CHOLEST SERPL-MCNC: 254 MG/DL (ref 100–199)
CO2 SERPL-SCNC: 20 MMOL/L (ref 20–29)
CREAT SERPL-MCNC: 0.78 MG/DL (ref 0.57–1)
EGFRCR SERPLBLD CKD-EPI 2021: 87 ML/MIN/1.73
FOLATE SERPL-MCNC: >20 NG/ML
GLUCOSE SERPL-MCNC: 93 MG/DL (ref 65–99)
HBA1C MFR BLD: 5.6 % (ref 4.8–5.6)
HDLC SERPL-MCNC: 92 MG/DL
IMP & REVIEW OF LAB RESULTS: NORMAL
LDLC SERPL CALC-MCNC: 153 MG/DL (ref 0–99)
POTASSIUM SERPL-SCNC: 4.3 MMOL/L (ref 3.5–5.2)
SODIUM SERPL-SCNC: 143 MMOL/L (ref 134–144)
T3FREE SERPL-MCNC: 2.7 PG/ML (ref 2–4.4)
T4 FREE SERPL-MCNC: 1.67 NG/DL (ref 0.82–1.77)
TRIGL SERPL-MCNC: 56 MG/DL (ref 0–149)
TSH SERPL DL<=0.005 MIU/L-ACNC: 0.16 UIU/ML (ref 0.45–4.5)
VIT B12 SERPL-MCNC: 623 PG/ML (ref 232–1245)
VLDLC SERPL CALC-MCNC: 9 MG/DL (ref 5–40)

## 2022-04-22 RX ORDER — LEVOTHYROXINE SODIUM 88 MCG
TABLET ORAL
Qty: 30 TABLET | Refills: 11 | Status: SHIPPED | OUTPATIENT
Start: 2022-04-22 | End: 2022-04-25 | Stop reason: SDUPTHER

## 2022-05-12 RX ORDER — LEVOTHYROXINE SODIUM 88 MCG
TABLET ORAL
Qty: 30 TABLET | Refills: 5 | Status: SHIPPED | OUTPATIENT
Start: 2022-05-12 | End: 2022-06-20 | Stop reason: SDUPTHER

## 2022-06-20 ENCOUNTER — TELEPHONE (OUTPATIENT)
Dept: ENDOCRINOLOGY | Age: 62
End: 2022-06-20

## 2022-06-20 DIAGNOSIS — C73 PAPILLARY THYROID CARCINOMA: ICD-10-CM

## 2022-06-20 DIAGNOSIS — E89.0 POSTABLATIVE HYPOTHYROIDISM: Primary | ICD-10-CM

## 2022-06-20 RX ORDER — LEVOTHYROXINE SODIUM 88 MCG
88 TABLET ORAL DAILY
Qty: 90 TABLET | Refills: 0 | Status: SHIPPED | OUTPATIENT
Start: 2022-06-20 | End: 2022-10-03

## 2022-06-20 NOTE — TELEPHONE ENCOUNTER
PATIENT CALLED NEEDS A REFILL ON    Synthroid 88 MCG tablet    NEEDS TO BE SENT TO [facility] [phone]

## 2022-10-03 DIAGNOSIS — C73 PAPILLARY THYROID CARCINOMA: ICD-10-CM

## 2022-10-03 DIAGNOSIS — E89.0 POSTABLATIVE HYPOTHYROIDISM: ICD-10-CM

## 2022-10-03 RX ORDER — LEVOTHYROXINE SODIUM 88 MCG
TABLET ORAL
Qty: 90 TABLET | Refills: 0 | Status: SHIPPED | OUTPATIENT
Start: 2022-10-03 | End: 2023-03-28 | Stop reason: SDUPTHER

## 2022-10-03 NOTE — TELEPHONE ENCOUNTER
Thyroid Hormones Protocol Failed 10/03/2022 08:11 AM   Protocol Details  Normal TSH in past 12 months

## 2022-10-10 ENCOUNTER — TELEPHONE (OUTPATIENT)
Dept: ENDOCRINOLOGY | Age: 62
End: 2022-10-10

## 2022-10-16 DIAGNOSIS — E89.0 POSTABLATIVE HYPOTHYROIDISM: Primary | ICD-10-CM

## 2022-10-16 DIAGNOSIS — C73 PAPILLARY THYROID CARCINOMA: ICD-10-CM

## 2022-11-10 ENCOUNTER — OFFICE VISIT (OUTPATIENT)
Dept: ENDOCRINOLOGY | Age: 62
End: 2022-11-10

## 2022-11-10 VITALS
DIASTOLIC BLOOD PRESSURE: 80 MMHG | HEART RATE: 81 BPM | WEIGHT: 198.4 LBS | TEMPERATURE: 97.1 F | SYSTOLIC BLOOD PRESSURE: 122 MMHG | HEIGHT: 68 IN | OXYGEN SATURATION: 96 % | BODY MASS INDEX: 30.07 KG/M2

## 2022-11-10 DIAGNOSIS — R53.82 CHRONIC FATIGUE: Primary | ICD-10-CM

## 2022-11-10 DIAGNOSIS — E89.0 POSTOPERATIVE HYPOTHYROIDISM: ICD-10-CM

## 2022-11-10 DIAGNOSIS — Z78.0 POST-MENOPAUSAL: ICD-10-CM

## 2022-11-10 DIAGNOSIS — C73 PAPILLARY THYROID CARCINOMA: ICD-10-CM

## 2022-11-10 PROCEDURE — 99214 OFFICE O/P EST MOD 30 MIN: CPT | Performed by: INTERNAL MEDICINE

## 2022-11-10 RX ORDER — LEVOTHYROXINE SODIUM 88 UG/1
88 TABLET ORAL DAILY
COMMUNITY
Start: 2022-06-20 | End: 2022-11-10

## 2022-11-10 NOTE — PROGRESS NOTES
"Chief Complaint  Chief Complaint   Patient presents with   • Hypothyroidism     Patient states that her energy levels are pretty stable some days shes fine others she is tired, she has no known family hx of thyroid disease( possible father dx), her weight isnt stable and she feels that she is higher than expected. She no longer has regular menstrual cycles        Subjective          History of Present Illness    Tiera Siegel 62 y.o. presents as a F/u patient for the evaluation of Hypothyroidism.  History of papillary thyroid cancer s/p surgery approximately 20 years ago.    Today in clinic pt reports that she is on synthroid 88 mcg oral daily. Takes on empty stomach, compliant with her medication.     She complains of feeling tired, weight has been stable, following diet changes, c/o constipation, c/o hair loss, c/o incresaed sweating, no dry skin, sleep is ok . c/o heat intolerance and no cold intolerance. c/o tremors, racing of heart and no eye symptoms.   Denied c/o difficulty breathing, swallowing and change in voice.   No family hx of thyroid disease.   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     In menopause.     Hyperlipidemia  Cannot tolerate statins     Vitamin D deficiency  On vitamin D replacement.    Reviewed primary care physician's/consulting physician documentation and lab results         I have reviewed the patient's allergies, medicines, past medical hx, family hx and social hx in detail.    Objective   Vital Signs:   /80   Pulse 81   Temp 97.1 °F (36.2 °C) (Temporal)   Ht 172.7 cm (68\")   Wt 90 kg (198 lb 6.4 oz)   SpO2 96%   BMI 30.17 kg/m²   Physical Exam   General appearance - no distress  Eyes- anicteric sclera  Ear nose and throat-external ears and nose normal.    Respiratory-normal chest on inspection.  No respiratory distress noted.  Skin-no rashes.  Neuro-alert and oriented x3            Result Review :   The following data was reviewed by: Trent " MD Artemio on 11/10/2022:  Results Encounter on 10/27/2022   Component Date Value Ref Range Status   • Glucose 10/27/2022 96  65 - 99 mg/dL Final   • BUN 10/27/2022 16  8 - 23 mg/dL Final   • Creatinine 10/27/2022 0.81  0.57 - 1.00 mg/dL Final   • EGFR Result 10/27/2022 83.2  >60.0 mL/min/1.73 Final    Comment: National Kidney Foundation and American Society of  Nephrology (ASN) Task Force recommended calculation based  on the Chronic Kidney Disease Epidemiology Collaboration  (CKD-EPI) equation refit without adjustment for race.  GFR Normal >60  Chronic Kidney Disease <60  Kidney Failure <15     • BUN/Creatinine Ratio 10/27/2022 19.8  7.0 - 25.0 Final   • Sodium 10/27/2022 141  136 - 145 mmol/L Final   • Potassium 10/27/2022 4.3  3.5 - 5.2 mmol/L Final   • Chloride 10/27/2022 105  98 - 107 mmol/L Final   • Total CO2 10/27/2022 29.0  22.0 - 29.0 mmol/L Final   • Calcium 10/27/2022 9.6  8.6 - 10.5 mg/dL Final   • Hemoglobin A1C 10/27/2022 5.70 (H)  4.80 - 5.60 % Final    Comment: Hemoglobin A1C Ranges:  Increased Risk for Diabetes  5.7% to 6.4%  Diabetes                     >= 6.5%  Diabetic Goal                < 7.0%     • Total Cholesterol 10/27/2022 288 (H)  0 - 200 mg/dL Final    Comment: Cholesterol Reference Ranges  (U.S. Department of Health and Human Services ATP III  Classifications)  Desirable          <200 mg/dL  Borderline High    200-239 mg/dL  High Risk          >240 mg/dL  Triglyceride Reference Ranges  (U.S. Department of Health and Human Services ATP III  Classifications)  Normal           <150 mg/dL  Borderline High  150-199 mg/dL  High             200-499 mg/dL  Very High        >500 mg/dL  HDL Reference Ranges  (U.S. Department of Health and Human Services ATP III  Classifications)  Low     <40 mg/dl (major risk factor for CHD)  High    >60 mg/dl ('negative' risk factor for CHD)  LDL Reference Ranges  (U.S. Department of Health and Human Services ATP III  Classifications)  Optimal          <100  mg/dL  Near Optimal     100-129 mg/dL  Borderline High  130-159 mg/dL  High             160-189 mg/dL  Very High        >189 mg/dL     • Triglycerides 10/27/2022 79  0 - 150 mg/dL Final   • HDL Cholesterol 10/27/2022 87 (H)  40 - 60 mg/dL Final   • VLDL Cholesterol Dat 10/27/2022 13  5 - 40 mg/dL Final   • LDL Chol Calc (NIH) 10/27/2022 188 (H)  0 - 100 mg/dL Final   • Creatinine, Urine 10/27/2022 106.8  Not Estab. mg/dL Final   • Microalbumin, Urine 10/27/2022 3.0  Not Estab. ug/mL Final   • Microalbumin/Creatinine Ratio 10/27/2022 3  0 - 29 mg/g creat Final    Comment:                        Normal:                0 -  29                         Moderately increased: 30 - 300                         Severely increased:       >300     • TSH 10/27/2022 0.610  0.270 - 4.200 uIU/mL Final   • Free T4 10/27/2022 1.52  0.93 - 1.70 ng/dL Final    Results may be falsely increased if patient taking Biotin.   • Interpretation 10/27/2022 Note   Final    Supplemental report is available.     Data reviewed: pcp and endocrine notes       Results Review:    I reviewed the patient's new clinical results.     Assessment and Plan    Problem List Items Addressed This Visit        Other    Chronic fatigue    Relevant Orders    TSH    T4, Free    T3, Free    TSH    T3, Free    T4, Free    Comprehensive Metabolic Panel    Hemoglobin A1c    Lipid Panel    Vitamin B12 & Folate    Vitamin D,25-Hydroxy   Other Visit Diagnoses     Postablative hypothyroidism    -  Primary    Relevant Orders    TSH    T4, Free    T3, Free    TSH    T3, Free    T4, Free    Comprehensive Metabolic Panel    Hemoglobin A1c    Lipid Panel    Vitamin B12 & Folate    Vitamin D,25-Hydroxy    Post-menopausal        Relevant Orders    DEXA Bone Density Axial    TSH    T4, Free    T3, Free    TSH    T3, Free    T4, Free    Comprehensive Metabolic Panel    Hemoglobin A1c    Lipid Panel    Vitamin B12 & Folate    Vitamin D,25-Hydroxy        Hypothyroidism  Continue  "Synthroid 88 mcg oral daily.    Vitamin D deficiency  Continue vitamin D replacement    Prediabetes  Discussed with the patient the importance of following dietary restrictions.    Continue to monitor the cholesterol panel, patient could not tolerate the statins.    Proceed with a bone density scan      Interpreted the blood work-up/imaging results performed by the primary care/consulting physician -    Refills sent to pharmacy    Follow Up     Patient was given instructions and counseling regarding her condition or for health maintenance advice. Please see specific information pulled into the AVS if appropriate.       Thank you for asking me to see your patient, Tiera Siegel in consultation.         Trent Ulloa MD  11/10/22      EMR Dragon / transcription disclaimer:     \"Dictated utilizing Dragon dictation\".              "

## 2022-11-30 ENCOUNTER — OFFICE VISIT (OUTPATIENT)
Dept: FAMILY MEDICINE CLINIC | Facility: CLINIC | Age: 62
End: 2022-11-30

## 2022-11-30 VITALS
TEMPERATURE: 97.3 F | HEIGHT: 68 IN | WEIGHT: 200.2 LBS | HEART RATE: 71 BPM | OXYGEN SATURATION: 99 % | BODY MASS INDEX: 30.34 KG/M2 | SYSTOLIC BLOOD PRESSURE: 112 MMHG | DIASTOLIC BLOOD PRESSURE: 76 MMHG

## 2022-11-30 DIAGNOSIS — C73 PAPILLARY THYROID CARCINOMA: Primary | ICD-10-CM

## 2022-11-30 DIAGNOSIS — N95.1 HOT FLASH, MENOPAUSAL: ICD-10-CM

## 2022-11-30 DIAGNOSIS — E89.0 POST-OPERATIVE HYPOTHYROIDISM: ICD-10-CM

## 2022-11-30 DIAGNOSIS — R63.5 WEIGHT GAIN: ICD-10-CM

## 2022-11-30 PROBLEM — Z80.0 FAMILY HISTORY OF MALIGNANT NEOPLASM OF GASTROINTESTINAL TRACT: Status: ACTIVE | Noted: 2017-04-25

## 2022-11-30 PROBLEM — Z12.11 SCREEN FOR COLON CANCER: Status: ACTIVE | Noted: 2017-04-25

## 2022-11-30 PROCEDURE — 99204 OFFICE O/P NEW MOD 45 MIN: CPT | Performed by: FAMILY MEDICINE

## 2022-11-30 RX ORDER — LORAZEPAM 0.5 MG/1
0.5 TABLET ORAL EVERY 8 HOURS PRN
COMMUNITY

## 2022-11-30 NOTE — PROGRESS NOTES
Chief Complaint  Establish Care    Subjective        Tiera Siegel presents to [facility]  History of Present Illness  The patient presents today to establish care. She was previously seen by Dr. Morris Augustin at [facility].    History of papillary thyroid cancer  The patient was diagnosed with papillary thyroid cancer 20 years ago. She denies having any thyroid tissue remaining. The patient states that her endocrinologist is managing her thyroid levels. Her last TSH was 0.6 uIU/mL. She has had low TSH levels in the past. She had radiation only after thyroid cancer, no chemotherapy. However, she did have to drink radioactive iodine and ingest 2 tablets.     Weight gain  The patient reports that she has struggled with weight gain her entire life. Her weight has increased by 11 pounds this year. Patient has a normal blood pressure. Her A1c levels have been 5.6 and 5.7 percent. 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 The patient states that she is tired of watching what she eats. She states that this year has been the year that she has been eating what she wants to eat. She does not have an appetite often. She notes that she is a stress eater. Patient confirms that she is pre-diabetic. She states that she does not do well on statins.     Hot flashes  The patient is on Paxil for hot flashes. She has been on Paxil for 2 to 3 years. She states that she has been trying to wean herself off of Paxil. Patient reports a history of endometriosis. She had a lot of pelvic pain. She states that she had gotten to the point where she was 2 weeks on and 2 weeks off. The patient had a supracervical hysterectomy, 10 to 12 years ago. She confirms that her uterus and ovaries have been removed. She confirms that Dr. Gee still does her Pap smears.    Depression  Ms. Siegel states that she had a lot of trouble with depression. Patient reports that she has been taking antidepressants for a  "long time.   She states that they were trying to get her acclimated with antidepressants, and then she had a hysterectomy and thyroidectomy.    Osteopenia  The patient reports that she has osteopenia. She states that she is scheduled for a bone density scan next month.    Surgical History  Patient confirms having a left breast biopsy. She had a quarter size lump, which was a fibroid. The fibroid had to be removed twice.     Health maintenance.  Ms. Siegel states that she is up to date with her colon cancer screening. Patient reports having a colonoscopy on 06/09/2022. She states that she has had her influenza vaccine, and all of her COVID-19 vaccines. She works for the [employer] department.   Objective    A review of systems was performed, and the pertinent positives are noted in the HPI.    Vital Signs:  /76   Pulse 71   Temp 97.3 °F (36.3 °C)   Ht 172.7 cm (68\")   Wt 90.8 kg (200 lb 3.2 oz)   SpO2 99%   BMI 30.44 kg/m²   Estimated body mass index is 30.44 kg/m² as calculated from the following:    Height as of this encounter: 172.7 cm (68\").    Weight as of this encounter: 90.8 kg (200 lb 3.2 oz).          Physical Exam  Constitutional:       General: She is not in acute distress.     Appearance: Normal appearance. She is well-developed.   HENT:      Head: Normocephalic and atraumatic.      Right Ear: Tympanic membrane, ear canal and external ear normal.      Left Ear: Tympanic membrane, ear canal and external ear normal.      Mouth/Throat:      Mouth: Mucous membranes are moist.      Pharynx: Oropharynx is clear.   Eyes:      Conjunctiva/sclera: Conjunctivae normal.      Pupils: Pupils are equal, round, and reactive to light.   Neck:      Thyroid: No thyromegaly.   Cardiovascular:      Rate and Rhythm: Normal rate and regular rhythm.      Heart sounds: No murmur heard.  Pulmonary:      Effort: Pulmonary effort is normal.      Breath sounds: Normal breath sounds. No wheezing.   Abdominal:      General: " Bowel sounds are normal.      Palpations: Abdomen is soft.      Tenderness: There is no abdominal tenderness.   Musculoskeletal:         General: Normal range of motion.      Cervical back: Neck supple.   Lymphadenopathy:      Cervical: No cervical adenopathy.   Skin:     General: Skin is warm and dry.   Neurological:      Mental Status: She is alert and oriented to person, place, and time.   Psychiatric:         Mood and Affect: Mood normal.         Behavior: Behavior normal.        Result Review :           TSH (10/27/2022 09:09)  Lipid Panel (10/27/2022 09:09)  Hemoglobin A1c (10/27/2022 09:09)       Assessment and Plan   Diagnoses and all orders for this visit:    1. Papillary thyroid carcinoma (HCC) (Primary)    2. Post-operative hypothyroidism    3. Hot flash, menopausal    4. Weight gain    She will do a trial off the Paxil to see if they can help her with weight loss.  Let me know if she wants to try something else for hot flashes.  Follow-up with me in 6 months for physical.         Follow Up   Return in about 5 months (around 4/30/2023) for Annual physical.  Patient was given instructions and counseling regarding her condition or for health maintenance advice. Please see specific information pulled into the AVS if appropriate.     Transcribed from ambient dictation for Meredith Lea Kehrer, MD by Noelle Thompson.  11/30/22   16:04 EST    Patient or patient representative verbalized consent to the visit recording.  I have personally performed the services described in this document as transcribed by the above individual, and it is both accurate and complete.

## 2022-11-30 NOTE — PATIENT INSTRUCTIONS
Let me know if unable to wean Paxil.      Calorie Counting for Weight Loss  Calories are units of energy. Your body needs a certain number of calories from food to keep going throughout the day. When you eat or drink more calories than your body needs, your body stores the extra calories mostly as fat. When you eat or drink fewer calories than your body needs, your body burns fat to get the energy it needs.  Calorie counting means keeping track of how many calories you eat and drink each day. Calorie counting can be helpful if you need to lose weight. If you eat fewer calories than your body needs, you should lose weight. Ask your health care provider what a healthy weight is for you.  For calorie counting to work, you will need to eat the right number of calories each day to lose a healthy amount of weight per week. A dietitian can help you figure out how many calories you need in a day and will suggest ways to reach your calorie goal.  A healthy amount of weight to lose each week is usually 1-2 lb (0.5-0.9 kg). This usually means that your daily calorie intake should be reduced by 500-750 calories.  Eating 1,200-1,500 calories a day can help most women lose weight.  Eating 1,500-1,800 calories a day can help most men lose weight.  What do I need to know about calorie counting?  Work with your health care provider or dietitian to determine how many calories you should get each day. To meet your daily calorie goal, you will need to:  Find out how many calories are in each food that you would like to eat. Try to do this before you eat.  Decide how much of the food you plan to eat.  Keep a food log. Do this by writing down what you ate and how many calories it had.  To successfully lose weight, it is important to balance calorie counting with a healthy lifestyle that includes regular activity.  Where do I find calorie information?  The number of calories in a food can be found on a Nutrition Facts label. If a food  does not have a Nutrition Facts label, try to look up the calories online or ask your dietitian for help.  Remember that calories are listed per serving. If you choose to have more than one serving of a food, you will have to multiply the calories per serving by the number of servings you plan to eat. For example, the label on a package of bread might say that a serving size is 1 slice and that there are 90 calories in a serving. If you eat 1 slice, you will have eaten 90 calories. If you eat 2 slices, you will have eaten 180 calories.  How do I keep a food log?  After each time that you eat, record the following in your food log as soon as possible:  What you ate. Be sure to include toppings, sauces, and other extras on the food.  How much you ate. This can be measured in cups, ounces, or number of items.  How many calories were in each food and drink.  The total number of calories in the food you ate.  Keep your food log near you, such as in a pocket-sized notebook or on an carissa or website on your mobile phone. Some programs will calculate calories for you and show you how many calories you have left to meet your daily goal.  What are some portion-control tips?  Know how many calories are in a serving. This will help you know how many servings you can have of a certain food.  Use a measuring cup to measure serving sizes. You could also try weighing out portions on a kitchen scale. With time, you will be able to estimate serving sizes for some foods.  Take time to put servings of different foods on your favorite plates or in your favorite bowls and cups so you know what a serving looks like.  Try not to eat straight from a food's packaging, such as from a bag or box. Eating straight from the package makes it hard to see how much you are eating and can lead to overeating. Put the amount you would like to eat in a cup or on a plate to make sure you are eating the right portion.  Use smaller plates, glasses, and bowls  for smaller portions and to prevent overeating.  Try not to multitask. For example, avoid watching TV or using your computer while eating. If it is time to eat, sit down at a table and enjoy your food. This will help you recognize when you are full. It will also help you be more mindful of what and how much you are eating.  What are tips for following this plan?  Reading food labels  Check the calorie count compared with the serving size. The serving size may be smaller than what you are used to eating.  Check the source of the calories. Try to choose foods that are high in protein, fiber, and vitamins, and low in saturated fat, trans fat, and sodium.  Shopping  Read nutrition labels while you shop. This will help you make healthy decisions about which foods to buy.  Pay attention to nutrition labels for low-fat or fat-free foods. These foods sometimes have the same number of calories or more calories than the full-fat versions. They also often have added sugar, starch, or salt to make up for flavor that was removed with the fat.  Make a grocery list of lower-calorie foods and stick to it.  Cooking  Try to cook your favorite foods in a healthier way. For example, try baking instead of frying.  Use low-fat dairy products.  Meal planning  Use more fruits and vegetables. One-half of your plate should be fruits and vegetables.  Include lean proteins, such as chicken, turkey, and fish.  Lifestyle  Each week, aim to do one of the followin minutes of moderate exercise, such as walking.  75 minutes of vigorous exercise, such as running.  General information  Know how many calories are in the foods you eat most often. This will help you calculate calorie counts faster.  Find a way of tracking calories that works for you. Get creative. Try different apps or programs if writing down calories does not work for you.  What foods should I eat?    Eat nutritious foods. It is better to have a nutritious, high-calorie food,  such as an avocado, than a food with few nutrients, such as a bag of potato chips.  Use your calories on foods and drinks that will fill you up and will not leave you hungry soon after eating.  Examples of foods that fill you up are nuts and nut butters, vegetables, lean proteins, and high-fiber foods such as whole grains. High-fiber foods are foods with more than 5 g of fiber per serving.  Pay attention to calories in drinks. Low-calorie drinks include water and unsweetened drinks.  The items listed above may not be a complete list of foods and beverages you can eat. Contact a dietitian for more information.  What foods should I limit?  Limit foods or drinks that are not good sources of vitamins, minerals, or protein or that are high in unhealthy fats. These include:  Candy.  Other sweets.  Sodas, specialty coffee drinks, alcohol, and juice.  The items listed above may not be a complete list of foods and beverages you should avoid. Contact a dietitian for more information.  How do I count calories when eating out?  Pay attention to portions. Often, portions are much larger when eating out. Try these tips to keep portions smaller:  Consider sharing a meal instead of getting your own.  If you get your own meal, eat only half of it. Before you start eating, ask for a container and put half of your meal into it.  When available, consider ordering smaller portions from the menu instead of full portions.  Pay attention to your food and drink choices. Knowing the way food is cooked and what is included with the meal can help you eat fewer calories.  If calories are listed on the menu, choose the lower-calorie options.  Choose dishes that include vegetables, fruits, whole grains, low-fat dairy products, and lean proteins.  Choose items that are boiled, broiled, grilled, or steamed. Avoid items that are buttered, battered, fried, or served with cream sauce. Items labeled as crispy are usually fried, unless stated  otherwise.  Choose water, low-fat milk, unsweetened iced tea, or other drinks without added sugar. If you want an alcoholic beverage, choose a lower-calorie option, such as a glass of wine or light beer.  Ask for dressings, sauces, and syrups on the side. These are usually high in calories, so you should limit the amount you eat.  If you want a salad, choose a garden salad and ask for grilled meats. Avoid extra toppings such as palomino, cheese, or fried items. Ask for the dressing on the side, or ask for olive oil and vinegar or lemon to use as dressing.  Estimate how many servings of a food you are given. Knowing serving sizes will help you be aware of how much food you are eating at restaurants.  Where to find more information  Centers for Disease Control and Prevention: www.cdc.gov  [place] Department of Agriculture: [employer]  Summary  Calorie counting means keeping track of how many calories you eat and drink each day. If you eat fewer calories than your body needs, you should lose weight.  A healthy amount of weight to lose per week is usually 1-2 lb (0.5-0.9 kg). This usually means reducing your daily calorie intake by 500-750 calories.  The number of calories in a food can be found on a Nutrition Facts label. If a food does not have a Nutrition Facts label, try to look up the calories online or ask your dietitian for help.  Use smaller plates, glasses, and bowls for smaller portions and to prevent overeating.  Use your calories on foods and drinks that will fill you up and not leave you hungry shortly after a meal.  This information is not intended to replace advice given to you by your health care provider. Make sure you discuss any questions you have with your health care provider.  Document Revised: 01/28/2021 Document Reviewed: 01/28/2021  Elsevier Patient Education © 2022 [employer] Inc[employer]

## 2022-12-02 ENCOUNTER — PATIENT ROUNDING (BHMG ONLY) (OUTPATIENT)
Dept: FAMILY MEDICINE CLINIC | Facility: CLINIC | Age: 62
End: 2022-12-02

## 2022-12-02 NOTE — PROGRESS NOTES
Sent Patient following in Startapp Message:  My name is Meghan Hamilton      I am the Practice Manager with   [facility] [address]  [facility] [address] [phone].      I am messaging to officially welcome you to our practice and ask about your recent visit.     Tell me about your visit with us. What things went well?         We're always looking for ways to make our patients' experiences even better. Do you have recommendations on ways we may improve?       Overall were you satisfied with your first visit to our practice?        Is there anything else I can do for you?       Thank you, and have a great day.

## 2022-12-30 ENCOUNTER — HOSPITAL ENCOUNTER (OUTPATIENT)
Dept: BONE DENSITY | Facility: HOSPITAL | Age: 62
Discharge: HOME | End: 2022-12-30
Admitting: INTERNAL MEDICINE

## 2022-12-30 DIAGNOSIS — Z78.0 POST-MENOPAUSAL: ICD-10-CM

## 2022-12-30 PROCEDURE — 77080 DXA BONE DENSITY AXIAL: CPT

## 2023-02-28 ENCOUNTER — OFFICE VISIT (OUTPATIENT)
Dept: OBSTETRICS AND GYNECOLOGY | Age: 63
End: 2023-02-28
Payer: COMMERCIAL

## 2023-02-28 VITALS
HEIGHT: 68 IN | SYSTOLIC BLOOD PRESSURE: 118 MMHG | BODY MASS INDEX: 30.22 KG/M2 | WEIGHT: 199.4 LBS | DIASTOLIC BLOOD PRESSURE: 78 MMHG

## 2023-02-28 DIAGNOSIS — Z12.31 ENCOUNTER FOR SCREENING MAMMOGRAM FOR MALIGNANT NEOPLASM OF BREAST: Primary | ICD-10-CM

## 2023-02-28 DIAGNOSIS — Z01.419 WELL WOMAN EXAM WITH ROUTINE GYNECOLOGICAL EXAM: ICD-10-CM

## 2023-02-28 DIAGNOSIS — N95.1 MENOPAUSAL SYMPTOMS: ICD-10-CM

## 2023-02-28 PROCEDURE — 99396 PREV VISIT EST AGE 40-64: CPT | Performed by: OBSTETRICS & GYNECOLOGY

## 2023-02-28 NOTE — PROGRESS NOTES
Routine Annual Visit    2023    Patient: Tiera Siegel          MR#:[id number]      Chief Complaint   Patient presents with   • Gynecologic Exam     AE Today, Last AE 2022 (-), HPV (-), MG 3/24/2022, Dexa 2022, Colonoscopy 2022       History of Present Illness    62 y.o. female  who presents for annual exam.   Has been eating healthier and exercising and feels like her hot flashes have gotten a lot better even after discontinuing the paxil.  Not SA  No  Urinary complaints    Health Maintenance  Last pap: , history abnormal: none  Mammogram:   Colonoscopy , repeat due 5 yr  Family history of Breast, ovarian, uterine, colon, pancreatic cancer: yes - dad colon cancer      No LMP recorded. Patient has had a hysterectomy.  Obstetric History:  OB History        0    Para   0    Term   0       0    AB   0    Living   0       SAB   0    IAB   0    Ectopic   0    Molar   0    Multiple   0    Live Births   0               Menstrual History:     No LMP recorded. Patient has had a hysterectomy.       ________________________________________  Patient Active Problem List   Diagnosis   • Cancer of thyroid (HCC)   • Papillary thyroid carcinoma (HCC)   • Post-operative hypothyroidism   • Abnormal weight   • Chronic fatigue   • Vitamin D deficiency   • Rash   • Surgical menopause   • Hot flash, menopausal   • S/P laparoscopic supracervical hysterectomy   • Abdominal mass, RLQ (right lower quadrant)   • Depression   • Family history of colon cancer   • Family history of malignant neoplasm of gastrointestinal tract   • NATHALIE (generalized anxiety disorder)   • Hypercholesteremia   • Hypothyroidism   • Screen for colon cancer   • Rosacea   • Liver lesion   • Internal hemorrhoids       Past Medical History:   Diagnosis Date   • Allergic ?   • Anxiety and depression    • Arthritis ?   • Endometriosis    • Fibrocystic breast    • Hx of radiation therapy    • Hyperlipidemia ?   •  "Hypothyroidism    • Osteopenia    • Raynaud's syndrome    • Thyroid cancer (HCC)        Family History   Problem Relation Age of Onset   • Hypertension Mother    • Colon cancer Father    • Thyroid disease Father    • Cancer Father         Colon cancer   • Breast cancer Maternal Aunt        Past Surgical History:   Procedure Laterality Date   • BILATERAL SALPINGO OOPHORECTOMY     • BREAST EXCISIONAL BIOPSY      lipoma   • HYSTERECTOMY SUPRACERVICAL  2012    AUB, perimenopause, endometriosis. did have BSO   • LIPOMA EXCISION      ABDOMAN   • OOPHORECTOMY     • THYROID SURGERY  01/2003   • TOTAL THYROIDECTOMY         Social History     Tobacco Use   Smoking Status Never   Smokeless Tobacco Never       has a current medication list which includes the following prescription(s): b complex-c, calcium carb-cholecalciferol, vitamin d3, fluzone quadrivalent, havrix, prosacea, ketotifen fumarate, lorazepam, magnesium, mometasone, naproxen sodium, synthroid, vitamin c, and zinc.  ________________________________________      Review of Systems   Constitutional: Negative for fever and unexpected weight change.   Respiratory: Negative for shortness of breath.    Cardiovascular: Negative for chest pain.   Gastrointestinal: Negative for abdominal pain, constipation and diarrhea.   Genitourinary: Negative for frequency and urgency.   Hematological: Negative for adenopathy.   Psychiatric/Behavioral: Negative for dysphoric mood.       Objective   Physical Exam    /78   Ht 172.7 cm (68\")   Wt 90.4 kg (199 lb 6.4 oz)   BMI 30.32 kg/m²    BP Readings from Last 3 Encounters:   02/28/23 118/78   11/30/22 112/76   11/10/22 122/80      Wt Readings from Last 3 Encounters:   02/28/23 90.4 kg (199 lb 6.4 oz)   11/30/22 90.8 kg (200 lb 3.2 oz)   11/10/22 90 kg (198 lb 6.4 oz)         BMI: Body mass index is 30.32 kg/m².       General:   alert, appears stated age and cooperative   Neck: No thyromegaly or LAD   Heart:: regular rate and " rhythm, S1, S2 normal, no murmur, click, rub or gallop   Lungs: normal respiratory effort and auscultation   Abdomen: soft, non-tender, without masses or organomegaly   Breast: inspection negative, no nipple discharge or bleeding, no masses or nodularity palpable   Urethra and bladder: urethral meatus normal; bladder nontender to palpation;   Vulva: normal, Bartholin's, Urethra, Bucyrus's normal   Vagina: normal mucosa, normal discharge   Cervix: no lesions and nulliparous appearance   Uterus: absent    Adnexa: normal adnexa and no mass, fullness, tenderness       Assessment:    normal annual exam   Menopause  History of hot flashes  History of supracervical hysterectomy    Plan:    Plan     [x]  Mammogram request made  []  PAP done  []  Labs:   []  GC/Chl/TV  []  DEXA scan   []  Referral for colonoscopy:     She stopped taking the Paxil and her hot flashes have not been an issue.  They may have resolved or it might be improved by better diet and exercise.  Either way, she is doing well and without gynecologic issue today.  Follow-up next year, she will be due for a Pap next year.    Counseling  [x]  Nutrition  [x]  Physical activity/regular exercise   [x]  Healthy weight  []  Injury prevention  []  Smoking cessation  []  Substance misuse/abuse  []  Sexual behavior  []  STD prevention  []  Contraception  []  Dental health  [x]  Mental health  []  Immunization  [x]  Encouraged SURENDRA Gee MD  02/28/2023  08:22 EST

## 2023-03-24 DIAGNOSIS — E89.0 POSTABLATIVE HYPOTHYROIDISM: ICD-10-CM

## 2023-03-24 DIAGNOSIS — C73 PAPILLARY THYROID CARCINOMA: ICD-10-CM

## 2023-03-27 RX ORDER — LEVOTHYROXINE SODIUM 88 MCG
TABLET ORAL
Qty: 90 TABLET | Refills: 0 | OUTPATIENT
Start: 2023-03-27

## 2023-03-28 DIAGNOSIS — E89.0 POSTABLATIVE HYPOTHYROIDISM: ICD-10-CM

## 2023-03-28 DIAGNOSIS — C73 PAPILLARY THYROID CARCINOMA: ICD-10-CM

## 2023-03-28 RX ORDER — LEVOTHYROXINE SODIUM 88 MCG
88 TABLET ORAL
Qty: 90 TABLET | Refills: 0 | Status: SHIPPED | OUTPATIENT
Start: 2023-03-28

## 2023-03-28 NOTE — TELEPHONE ENCOUNTER
Caller: Tiera Siegel    Relationship: Self    Best call back number: [phone]    Requested Prescriptions:   Requested Prescriptions     Pending Prescriptions Disp Refills   • Synthroid 88 MCG tablet 90 tablet 0        Pharmacy where request should be sent: [address] - [phone]  - [phone] [facility]     Last office visit with prescribing clinician: 11/30/2022   Last telemedicine visit with prescribing clinician: 5/4/2023   Next office visit with prescribing clinician: 5/4/2023     Additional details provided by patient: WILL NEED THIS FILLED     Does the patient have less than a 3 day supply:  [] Yes  [x] No    Would you like a call back once the refill request has been completed: [] Yes [x] No    If the office needs to give you a call back, can they leave a voicemail: [] Yes [x] No    Apolinar Bird   03/28/23 08:08 EDT

## 2023-03-30 ENCOUNTER — APPOINTMENT (OUTPATIENT)
Dept: WOMENS IMAGING | Facility: HOSPITAL | Age: 63
End: 2023-03-30
Payer: COMMERCIAL

## 2023-03-30 PROCEDURE — 77063 BREAST TOMOSYNTHESIS BI: CPT | Performed by: RADIOLOGY

## 2023-03-30 PROCEDURE — 77067 SCR MAMMO BI INCL CAD: CPT | Performed by: RADIOLOGY

## 2023-05-04 ENCOUNTER — OFFICE VISIT (OUTPATIENT)
Dept: FAMILY MEDICINE CLINIC | Facility: CLINIC | Age: 63
End: 2023-05-04
Payer: COMMERCIAL

## 2023-05-04 VITALS
SYSTOLIC BLOOD PRESSURE: 132 MMHG | WEIGHT: 194.2 LBS | HEIGHT: 68 IN | DIASTOLIC BLOOD PRESSURE: 78 MMHG | BODY MASS INDEX: 29.43 KG/M2 | TEMPERATURE: 98 F | OXYGEN SATURATION: 96 % | HEART RATE: 78 BPM

## 2023-05-04 DIAGNOSIS — F33.1 MODERATE EPISODE OF RECURRENT MAJOR DEPRESSIVE DISORDER: ICD-10-CM

## 2023-05-04 DIAGNOSIS — Z00.01 ENCOUNTER FOR ANNUAL GENERAL MEDICAL EXAMINATION WITH ABNORMAL FINDINGS IN ADULT: Primary | ICD-10-CM

## 2023-05-04 DIAGNOSIS — E78.49 OTHER HYPERLIPIDEMIA: ICD-10-CM

## 2023-05-04 DIAGNOSIS — R73.03 PREDIABETES: ICD-10-CM

## 2023-05-04 DIAGNOSIS — C73 PAPILLARY THYROID CARCINOMA: ICD-10-CM

## 2023-05-04 DIAGNOSIS — Z11.59 NEED FOR HEPATITIS C SCREENING TEST: ICD-10-CM

## 2023-05-04 DIAGNOSIS — R63.5 WEIGHT GAIN: ICD-10-CM

## 2023-05-04 DIAGNOSIS — E89.0 POSTABLATIVE HYPOTHYROIDISM: ICD-10-CM

## 2023-05-04 DIAGNOSIS — R53.83 OTHER FATIGUE: ICD-10-CM

## 2023-05-04 RX ORDER — VENLAFAXINE HYDROCHLORIDE 75 MG/1
75 CAPSULE, EXTENDED RELEASE ORAL DAILY
Qty: 30 CAPSULE | Refills: 1 | Status: SHIPPED | OUTPATIENT
Start: 2023-05-04

## 2023-05-04 RX ORDER — LORATADINE 10 MG/1
CAPSULE, LIQUID FILLED ORAL
COMMUNITY

## 2023-05-04 NOTE — PROGRESS NOTES
Subjective   Tiera Siegel is a 62 y.o. female who presents for annual female wellness exam.  Chief Complaint   Patient presents with   • Annual Exam     No pap    • Hyperlipidemia   • Hypothyroidism   • Anxiety     Here for PE.  Has been having trouble handling stress.  Stressed at home and at work.  Her mother relies on her.  Does feel that she is a stress eater.  Has tried the Mediterranean diet and Optavia.  Has been tearful.  Contracts for safety.  Denies SI HI or hopelessness.  Responded to treatment for depression years ago.    Menstrual History: menopause  Pregnancy History: G0  Sexual History: not now   Contraception: na  Hormone Replacement Therapy: na  Diet: trying to be healthy  Exercise: not now  Up to date with vision and dental.     Mammogram: 3/2023  Pap Smear: 2022  Bone Density: 2022  Colon Cancer Screenin    Immunization History   Administered Date(s) Administered   • COVID-19 (MODERNA) 1st,2nd,3rd Dose Monovalent 01/15/2021, 2021, 2021, 2022   • COVID-19 (MODERNA) BIVALENT 12+YRS 10/18/2022   • Flu Vaccine Split Quad 10/01/2009   • FluLaval/Fluzone >6mos 2016, 2019, 2020, 2021, 10/18/2022   • Influenza, Unspecified 2016, 2021, 10/18/2022   • Td, Not Adsorbed 10/21/2009   • Tdap 10/21/2009, 2020       The following portions of the patient's history were reviewed and updated as appropriate: allergies, current medications, past family history, past medical history, past social history, past surgical history and problem list.    Past Medical History:   Diagnosis Date   • Allergic ?   • Anxiety and depression    • Arthritis ?   • Diabetes mellitus     Pre-diabetic   • Endometriosis    • Fibrocystic breast    • Hx of radiation therapy    • Hyperlipidemia ?   • Hypothyroidism    • Osteopenia    • Raynaud's syndrome    • Thyroid cancer        Past Surgical History:   Procedure Laterality Date   • BILATERAL SALPINGO OOPHORECTOMY      • BREAST EXCISIONAL BIOPSY      lipoma   • COLONOSCOPY     • HYSTERECTOMY SUPRACERVICAL  2012    AUB, perimenopause, endometriosis. did have BSO   • LIPOMA EXCISION      ABDOMAN   • OOPHORECTOMY     • THYROID SURGERY  01/2003   • TONSILLECTOMY     • TOTAL THYROIDECTOMY         Family History   Problem Relation Age of Onset   • Hypertension Mother    • Colon cancer Father    • Thyroid disease Father    • Cancer Father         Colon cancer   • Arthritis Father         Rheumatoid arthritis   • Diabetes Father    • Heart disease Father    • Breast cancer Maternal Aunt        Social History     Socioeconomic History   • Marital status:    Tobacco Use   • Smoking status: Never   • Smokeless tobacco: Never   Substance and Sexual Activity   • Alcohol use: Not Currently     Alcohol/week: 2.0 standard drinks     Types: 2 Glasses of wine per week     Comment: occ   • Drug use: No   • Sexual activity: Not Currently     Birth control/protection: Hysterectomy         Current Outpatient Medications:   •  B COMPLEX-C ER PO, Take  by mouth., Disp: , Rfl:   •  calcium carb-cholecalciferol 600-800 MG-UNIT tablet, Take 1 tablet by mouth., Disp: , Rfl:   •  Cholecalciferol (VITAMIN D3) 2000 units capsule, Take  by mouth., Disp: , Rfl:   •  Homeopathic Products (PROSACEA) gel, Apply  topically to the appropriate area as directed., Disp: , Rfl:   •  KETOTIFEN FUMARATE OP, Apply 1 drop to eye(s) as directed by provider., Disp: , Rfl:   •  Loratadine (Claritin) 10 MG capsule, Take  by mouth., Disp: , Rfl:   •  LORazepam (ATIVAN) 0.5 MG tablet, Take 1 tablet by mouth Every 8 (Eight) Hours As Needed for Anxiety., Disp: , Rfl:   •  magnesium 30 MG tablet, Take 1 tablet by mouth 2 (Two) Times a Day., Disp: , Rfl:   •  mometasone (NASONEX) 50 MCG/ACT nasal spray, , Disp: , Rfl:   •  naproxen sodium (ALEVE) 220 MG tablet, Take 1 tablet by mouth 2 (Two) Times a Day As Needed for Mild Pain., Disp: , Rfl:   •  Synthroid 88 MCG tablet,  "Take 1 tablet by mouth Every Morning., Disp: 90 tablet, Rfl: 0  •  vitamin C (ASCORBIC ACID) 250 MG tablet, Take 1 tablet by mouth Daily., Disp: , Rfl:   •  Zinc 50 MG capsule, Take  by mouth., Disp: , Rfl:   •  FLUZONE QUADRIVALENT suspension IM suspension, See Admin Instructions. (Patient not taking: Reported on 5/4/2023), Disp: , Rfl: 0  •  HAVRIX 1440 EL U/ML vaccine, See Admin Instructions. (Patient not taking: Reported on 5/4/2023), Disp: , Rfl: 1  •  venlafaxine XR (Effexor XR) 75 MG 24 hr capsule, Take 1 capsule by mouth Daily., Disp: 30 capsule, Rfl: 1    Review of Systems    Objective   Vitals:    05/04/23 0932   BP: 132/78   Pulse: 78   Temp: 98 °F (36.7 °C)   SpO2: 96%   Weight: 88.1 kg (194 lb 3.2 oz)   Height: 172.7 cm (68\")     Body mass index is 29.53 kg/m².  Physical Exam  Vitals and nursing note reviewed.   Constitutional:       General: She is not in acute distress.     Appearance: Normal appearance. She is well-developed.   HENT:      Head: Normocephalic and atraumatic.      Right Ear: Tympanic membrane, ear canal and external ear normal.      Left Ear: Tympanic membrane, ear canal and external ear normal.      Nose: Nose normal.      Mouth/Throat:      Mouth: Mucous membranes are moist.      Pharynx: Oropharynx is clear. No oropharyngeal exudate or posterior oropharyngeal erythema.   Eyes:      Conjunctiva/sclera: Conjunctivae normal.      Pupils: Pupils are equal, round, and reactive to light.   Neck:      Thyroid: No thyromegaly.   Cardiovascular:      Rate and Rhythm: Normal rate and regular rhythm.      Heart sounds: No murmur heard.  Pulmonary:      Effort: Pulmonary effort is normal.      Breath sounds: Normal breath sounds. No wheezing.   Abdominal:      General: Abdomen is flat. Bowel sounds are normal. There is no distension.      Palpations: Abdomen is soft. There is no mass.      Tenderness: There is no abdominal tenderness.      Hernia: No hernia is present.   Musculoskeletal:        "  General: No swelling. Normal range of motion.      Cervical back: Normal range of motion and neck supple.      Right lower leg: No edema.      Left lower leg: No edema.   Lymphadenopathy:      Cervical: No cervical adenopathy.   Skin:     General: Skin is warm and dry.      Capillary Refill: Capillary refill takes less than 2 seconds.      Findings: No rash.   Neurological:      General: No focal deficit present.      Mental Status: She is alert and oriented to person, place, and time.      Cranial Nerves: No cranial nerve deficit.   Psychiatric:         Mood and Affect: Mood normal.         Behavior: Behavior normal.           Assessment & Plan   Diagnoses and all orders for this visit:    1. Encounter for annual general medical examination with abnormal findings in adult (Primary)    2. Other hyperlipidemia  -     Comprehensive Metabolic Panel  -     Lipid Panel    3. Postablative hypothyroidism  -     TSH  -     Vitamin D,25-Hydroxy  -     T3, Free  -     T4, Free    4. Papillary thyroid carcinoma    5. Weight gain    6. Prediabetes  -     CBC & Differential  -     Comprehensive Metabolic Panel  -     Lipid Panel  -     TSH  -     Hemoglobin A1c    7. Moderate episode of recurrent major depressive disorder  -     venlafaxine XR (Effexor XR) 75 MG 24 hr capsule; Take 1 capsule by mouth Daily.  Dispense: 30 capsule; Refill: 1    8. Need for hepatitis C screening test  -     Hepatitis C Antibody    9. Other fatigue  -     Vitamin B12  -     Vitamin D,25-Hydroxy      Moderate episode of recurrent depression-we will do Effexor and have her follow-up in a month         Discussed the importance of maintaining a healthy weight and getting regular exercise.  Educated patient on the benefits of healthy diet.  Advise follow-up annually for wellness exams.    There are no Patient Instructions on file for this visit.

## 2023-05-05 LAB
25(OH)D3+25(OH)D2 SERPL-MCNC: 53 NG/ML (ref 30–100)
ALBUMIN SERPL-MCNC: 4.4 G/DL (ref 3.5–5.2)
ALBUMIN/GLOB SERPL: 1.6 G/DL
ALP SERPL-CCNC: 61 U/L (ref 39–117)
ALT SERPL-CCNC: 11 U/L (ref 1–33)
AST SERPL-CCNC: 16 U/L (ref 1–32)
BASOPHILS # BLD AUTO: 0.06 10*3/MM3 (ref 0–0.2)
BASOPHILS NFR BLD AUTO: 1 % (ref 0–1.5)
BILIRUB SERPL-MCNC: 0.4 MG/DL (ref 0–1.2)
BUN SERPL-MCNC: 18 MG/DL (ref 8–23)
BUN/CREAT SERPL: 22 (ref 7–25)
CALCIUM SERPL-MCNC: 9.6 MG/DL (ref 8.6–10.5)
CHLORIDE SERPL-SCNC: 104 MMOL/L (ref 98–107)
CHOLEST SERPL-MCNC: 288 MG/DL (ref 0–200)
CO2 SERPL-SCNC: 24 MMOL/L (ref 22–29)
CREAT SERPL-MCNC: 0.82 MG/DL (ref 0.57–1)
EGFRCR SERPLBLD CKD-EPI 2021: 82 ML/MIN/1.73
EOSINOPHIL # BLD AUTO: 0.52 10*3/MM3 (ref 0–0.4)
EOSINOPHIL NFR BLD AUTO: 8.6 % (ref 0.3–6.2)
ERYTHROCYTE [DISTWIDTH] IN BLOOD BY AUTOMATED COUNT: 11.9 % (ref 12.3–15.4)
GLOBULIN SER CALC-MCNC: 2.8 GM/DL
GLUCOSE SERPL-MCNC: 91 MG/DL (ref 65–99)
HBA1C MFR BLD: 5.6 % (ref 4.8–5.6)
HCT VFR BLD AUTO: 40.1 % (ref 34–46.6)
HCV IGG SERPL QL IA: NON REACTIVE
HDLC SERPL-MCNC: 86 MG/DL (ref 40–60)
HGB BLD-MCNC: 13.7 G/DL (ref 12–15.9)
IMM GRANULOCYTES # BLD AUTO: 0.01 10*3/MM3 (ref 0–0.05)
IMM GRANULOCYTES NFR BLD AUTO: 0.2 % (ref 0–0.5)
LDLC SERPL CALC-MCNC: 191 MG/DL (ref 0–100)
LYMPHOCYTES # BLD AUTO: 2.1 10*3/MM3 (ref 0.7–3.1)
LYMPHOCYTES NFR BLD AUTO: 34.8 % (ref 19.6–45.3)
MCH RBC QN AUTO: 31.8 PG (ref 26.6–33)
MCHC RBC AUTO-ENTMCNC: 34.2 G/DL (ref 31.5–35.7)
MCV RBC AUTO: 93 FL (ref 79–97)
MONOCYTES # BLD AUTO: 0.47 10*3/MM3 (ref 0.1–0.9)
MONOCYTES NFR BLD AUTO: 7.8 % (ref 5–12)
NEUTROPHILS # BLD AUTO: 2.87 10*3/MM3 (ref 1.7–7)
NEUTROPHILS NFR BLD AUTO: 47.6 % (ref 42.7–76)
NRBC BLD AUTO-RTO: 0 /100 WBC (ref 0–0.2)
PLATELET # BLD AUTO: 282 10*3/MM3 (ref 140–450)
POTASSIUM SERPL-SCNC: 4.3 MMOL/L (ref 3.5–5.2)
PROT SERPL-MCNC: 7.2 G/DL (ref 6–8.5)
RBC # BLD AUTO: 4.31 10*6/MM3 (ref 3.77–5.28)
SODIUM SERPL-SCNC: 142 MMOL/L (ref 136–145)
T3FREE SERPL-MCNC: 2.6 PG/ML (ref 2–4.4)
T4 FREE SERPL-MCNC: 1.43 NG/DL (ref 0.93–1.7)
TRIGL SERPL-MCNC: 73 MG/DL (ref 0–150)
TSH SERPL DL<=0.005 MIU/L-ACNC: 2.26 UIU/ML (ref 0.27–4.2)
VIT B12 SERPL-MCNC: 1000 PG/ML (ref 211–946)
VLDLC SERPL CALC-MCNC: 11 MG/DL (ref 5–40)
WBC # BLD AUTO: 6.03 10*3/MM3 (ref 3.4–10.8)

## 2023-06-01 DIAGNOSIS — F33.1 MODERATE EPISODE OF RECURRENT MAJOR DEPRESSIVE DISORDER: ICD-10-CM

## 2023-06-01 RX ORDER — VENLAFAXINE HYDROCHLORIDE 75 MG/1
75 CAPSULE, EXTENDED RELEASE ORAL DAILY
Qty: 30 CAPSULE | Refills: 1 | Status: SHIPPED | OUTPATIENT
Start: 2023-06-01

## 2023-06-09 ENCOUNTER — OFFICE VISIT (OUTPATIENT)
Dept: FAMILY MEDICINE CLINIC | Facility: CLINIC | Age: 63
End: 2023-06-09
Payer: COMMERCIAL

## 2023-06-09 VITALS
TEMPERATURE: 97.3 F | BODY MASS INDEX: 29.19 KG/M2 | HEIGHT: 68 IN | WEIGHT: 192.6 LBS | OXYGEN SATURATION: 97 % | HEART RATE: 74 BPM | DIASTOLIC BLOOD PRESSURE: 78 MMHG | SYSTOLIC BLOOD PRESSURE: 124 MMHG

## 2023-06-09 DIAGNOSIS — F33.1 MODERATE EPISODE OF RECURRENT MAJOR DEPRESSIVE DISORDER: Primary | ICD-10-CM

## 2023-06-09 PROCEDURE — 99213 OFFICE O/P EST LOW 20 MIN: CPT | Performed by: FAMILY MEDICINE

## 2023-06-09 RX ORDER — VENLAFAXINE HYDROCHLORIDE 150 MG/1
150 CAPSULE, EXTENDED RELEASE ORAL DAILY
Qty: 30 CAPSULE | Refills: 2 | Status: SHIPPED | OUTPATIENT
Start: 2023-06-09

## 2023-06-09 NOTE — PROGRESS NOTES
"Chief Complaint  Anxiety and Depression    Subjective        Tiera Siegel presents to [facility]  History of Present Illness  Patient presents for 1 month follow on starting Effexor.  She feels it is helped tremendously but she is willing to try higher dose.  Her mood is good.  She denies any SI or HI or hopelessness.  She has had longstanding and recurrent depression in her life and understands it is probably better to stay on medication at this point.      Objective   Vital Signs:  /78   Pulse 74   Temp 97.3 °F (36.3 °C)   Ht 172.7 cm (68\")   Wt 87.4 kg (192 lb 9.6 oz)   SpO2 97%   BMI 29.28 kg/m²   Estimated body mass index is 29.28 kg/m² as calculated from the following:    Height as of this encounter: 172.7 cm (68\").    Weight as of this encounter: 87.4 kg (192 lb 9.6 oz).             Physical Exam  Constitutional:       General: She is not in acute distress.     Appearance: Normal appearance. She is well-developed.   HENT:      Head: Normocephalic and atraumatic.      Right Ear: External ear normal.      Left Ear: External ear normal.   Eyes:      Conjunctiva/sclera: Conjunctivae normal.      Pupils: Pupils are equal, round, and reactive to light.   Neck:      Thyroid: No thyromegaly.   Pulmonary:      Effort: Pulmonary effort is normal.   Neurological:      Mental Status: She is alert and oriented to person, place, and time.   Psychiatric:         Mood and Affect: Mood normal.         Behavior: Behavior normal.        Result Review :                   Assessment and Plan   Diagnoses and all orders for this visit:    1. Moderate episode of recurrent major depressive disorder (Primary)  -     venlafaxine XR (EFFEXOR-XR) 150 MG 24 hr capsule; Take 1 capsule by mouth Daily.  Dispense: 30 capsule; Refill: 2    Moderate depression-improved but not quite to goal, will increase Effexor and follow-up in a couple months.  Patient contracts for safety.         Follow Up "   Return in about 2 months (around 8/9/2023) for Recheck mood.  Patient was given instructions and counseling regarding her condition or for health maintenance advice. Please see specific information pulled into the AVS if appropriate.

## 2023-06-13 DIAGNOSIS — C73 PAPILLARY THYROID CARCINOMA: ICD-10-CM

## 2023-06-13 DIAGNOSIS — E89.0 POSTABLATIVE HYPOTHYROIDISM: ICD-10-CM

## 2023-06-13 RX ORDER — LEVOTHYROXINE SODIUM 88 MCG
TABLET ORAL
Qty: 90 TABLET | Refills: 0 | Status: SHIPPED | OUTPATIENT
Start: 2023-06-13

## 2023-08-11 DIAGNOSIS — F33.1 MODERATE EPISODE OF RECURRENT MAJOR DEPRESSIVE DISORDER: ICD-10-CM

## 2023-08-11 RX ORDER — VENLAFAXINE HYDROCHLORIDE 150 MG/1
150 CAPSULE, EXTENDED RELEASE ORAL DAILY
Qty: 30 CAPSULE | Refills: 0 | Status: SHIPPED | OUTPATIENT
Start: 2023-08-11

## 2023-08-18 ENCOUNTER — OFFICE VISIT (OUTPATIENT)
Dept: FAMILY MEDICINE CLINIC | Facility: CLINIC | Age: 63
End: 2023-08-18
Payer: COMMERCIAL

## 2023-08-18 VITALS
TEMPERATURE: 98.3 F | HEART RATE: 94 BPM | SYSTOLIC BLOOD PRESSURE: 114 MMHG | WEIGHT: 187.4 LBS | HEIGHT: 68 IN | BODY MASS INDEX: 28.4 KG/M2 | OXYGEN SATURATION: 97 % | DIASTOLIC BLOOD PRESSURE: 70 MMHG

## 2023-08-18 DIAGNOSIS — C73 PAPILLARY THYROID CARCINOMA: ICD-10-CM

## 2023-08-18 DIAGNOSIS — E89.0 POSTABLATIVE HYPOTHYROIDISM: ICD-10-CM

## 2023-08-18 DIAGNOSIS — F33.1 MODERATE EPISODE OF RECURRENT MAJOR DEPRESSIVE DISORDER: Primary | ICD-10-CM

## 2023-08-18 DIAGNOSIS — K59.09 OTHER CONSTIPATION: ICD-10-CM

## 2023-08-18 PROCEDURE — 99213 OFFICE O/P EST LOW 20 MIN: CPT | Performed by: FAMILY MEDICINE

## 2023-08-18 RX ORDER — VENLAFAXINE HYDROCHLORIDE 150 MG/1
150 CAPSULE, EXTENDED RELEASE ORAL DAILY
Qty: 90 CAPSULE | Refills: 0 | Status: SHIPPED | OUTPATIENT
Start: 2023-08-18

## 2023-08-18 NOTE — PROGRESS NOTES
"Chief Complaint  Med Refill, Anxiety, Depression, Hypothyroidism, and Constipation    Subjective        Tiera Siegel presents to [facility]  History of Present Illness  Patient presents for follow-up on her mood.  She has been doing very well with her mood and has a lot of stressors.  She is concerned about some increased constipation.  She states she otherwise feels like she is not having any thyroid symptoms.    Objective   Vital Signs:  /70   Pulse 94   Temp 98.3 øF (36.8 øC)   Ht 172.7 cm (68\")   Wt 85 kg (187 lb 6.4 oz)   SpO2 97%   BMI 28.49 kg/mý   Estimated body mass index is 28.49 kg/mý as calculated from the following:    Height as of this encounter: 172.7 cm (68\").    Weight as of this encounter: 85 kg (187 lb 6.4 oz).             Physical Exam  Constitutional:       General: She is not in acute distress.     Appearance: Normal appearance. She is well-developed.   HENT:      Head: Normocephalic and atraumatic.      Right Ear: External ear normal.      Left Ear: External ear normal.   Eyes:      Conjunctiva/sclera: Conjunctivae normal.      Pupils: Pupils are equal, round, and reactive to light.   Neck:      Thyroid: No thyromegaly.   Pulmonary:      Effort: Pulmonary effort is normal.   Abdominal:      General: Bowel sounds are normal.      Palpations: Abdomen is soft.      Tenderness: There is no abdominal tenderness.   Neurological:      Mental Status: She is alert and oriented to person, place, and time.   Psychiatric:         Mood and Affect: Mood normal.         Behavior: Behavior normal.      Result Review :                   Assessment and Plan   Diagnoses and all orders for this visit:    1. Moderate episode of recurrent major depressive disorder (Primary)  -     venlafaxine XR (EFFEXOR-XR) 150 MG 24 hr capsule; Take 1 capsule by mouth Daily.  Dispense: 90 capsule; Refill: 0    2. Other constipation    3. Postablative hypothyroidism    4. Papillary " thyroid carcinoma    Depression-stable, continue venlafaxine  Constipation-discussed with patient to add fiber in addition to her water and healthy foods, add in MiraLAX if needed         Follow Up   Return in about 3 months (around 11/18/2023) for Recheck mood, thyroid.  Patient was given instructions and counseling regarding her condition or for health maintenance advice. Please see specific information pulled into the AVS if appropriate.       Answers submitted by the patient for this visit:  Other (Submitted on 8/11/2023)  Please describe your symptoms.: Follow up with medication  Have you had these symptoms before?: No  How long have you been having these symptoms?: Greater than 2 weeks  Primary Reason for Visit (Submitted on 8/11/2023)  What is the primary reason for your visit?: Other

## 2023-11-10 DIAGNOSIS — E89.0 POSTABLATIVE HYPOTHYROIDISM: ICD-10-CM

## 2023-11-10 DIAGNOSIS — C73 PAPILLARY THYROID CARCINOMA: ICD-10-CM

## 2023-11-10 RX ORDER — LEVOTHYROXINE SODIUM 88 MCG
TABLET ORAL
Qty: 90 TABLET | Refills: 0 | Status: SHIPPED | OUTPATIENT
Start: 2023-11-10

## 2024-03-14 ENCOUNTER — OFFICE VISIT (OUTPATIENT)
Dept: OBSTETRICS AND GYNECOLOGY | Age: 64
End: 2024-03-14
Payer: COMMERCIAL

## 2024-03-14 VITALS
HEIGHT: 68 IN | DIASTOLIC BLOOD PRESSURE: 72 MMHG | WEIGHT: 189 LBS | SYSTOLIC BLOOD PRESSURE: 128 MMHG | BODY MASS INDEX: 28.64 KG/M2

## 2024-03-14 DIAGNOSIS — Z90.711 S/P LAPAROSCOPIC SUPRACERVICAL HYSTERECTOMY: ICD-10-CM

## 2024-03-14 DIAGNOSIS — Z12.4 SCREENING FOR MALIGNANT NEOPLASM OF CERVIX: ICD-10-CM

## 2024-03-14 DIAGNOSIS — Z13.89 SCREENING FOR BLOOD OR PROTEIN IN URINE: ICD-10-CM

## 2024-03-14 DIAGNOSIS — Z11.51 SCREENING FOR HUMAN PAPILLOMAVIRUS (HPV): ICD-10-CM

## 2024-03-14 DIAGNOSIS — Z01.419 WELL FEMALE EXAM WITH ROUTINE GYNECOLOGICAL EXAM: Primary | ICD-10-CM

## 2024-03-14 DIAGNOSIS — Z12.31 SCREENING MAMMOGRAM FOR BREAST CANCER: ICD-10-CM

## 2024-03-14 LAB
BILIRUB BLD-MCNC: NEGATIVE MG/DL
GLUCOSE UR STRIP-MCNC: NEGATIVE MG/DL
KETONES UR QL: NEGATIVE
LEUKOCYTE EST, POC: NEGATIVE
NITRITE UR-MCNC: NEGATIVE MG/ML
PH UR: 5.5 [PH] (ref 5–8)
PROT UR STRIP-MCNC: NEGATIVE MG/DL
RBC # UR STRIP: NEGATIVE /UL
SP GR UR: 1.02 (ref 1–1.03)
UROBILINOGEN UR QL: NORMAL

## 2024-03-14 RX ORDER — EZETIMIBE 10 MG/1
10 TABLET ORAL DAILY
COMMUNITY

## 2024-03-14 NOTE — PROGRESS NOTES
Subjective     Chief Complaint   Patient presents with    Gynecologic Exam     CC: Annual, last pap 22 neg, HPV neg, last mammo 3/31/23 neg, pt had Hyst       History of Present Illness    Tiera Siegel is a 63 y.o.  very pleasant patient who presents for annual exam.  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 Patient has had a hysterectomy.         Current contraception: status post hysterectomy  History of abnormal Pap smear: no  Received Gardasil immunization: no  Perform regular self breast exam: yes - occasionally  Family history of uterine or ovarian cancer: no  Family History of colon cancer: yes - dad  Family history of breast cancer: yes - maternal aunt    Mammogram: ordered.  Colonoscopy: up to date.  DEXA: not indicated.    Exercise: moderately active  Calcium/Vitamin D: adequate intake and uses supplements    The following portions of the patient's history were reviewed and updated as appropriate: allergies, current medications, past family history, past medical history, past social history, past surgical history, and problem list.    Review of Systems   Constitutional: Negative.    HENT: Negative.     Eyes: Negative.    Respiratory: Negative.     Cardiovascular: Negative.    Gastrointestinal: Negative.    Endocrine: Negative.    Genitourinary: Negative.    Musculoskeletal: Negative.    Skin: Negative.    Allergic/Immunologic: Negative.    Neurological: Negative.    Hematological: Negative.    Psychiatric/Behavioral: Negative.             Objective   Physical Exam  Vitals and nursing note reviewed.   Constitutional:        "Appearance: Normal appearance.   HENT:      Head: Normocephalic.   Eyes:      Pupils: Pupils are equal, round, and reactive to light.   Cardiovascular:      Rate and Rhythm: Normal rate and regular rhythm.      Pulses: Normal pulses.      Heart sounds: Normal heart sounds.   Pulmonary:      Effort: Pulmonary effort is normal.      Breath sounds: Normal breath sounds.   Chest:   Breasts:     Right: Normal.      Left: Normal.   Abdominal:      General: Abdomen is flat. Bowel sounds are normal.      Palpations: Abdomen is soft.   Genitourinary:     General: Normal vulva.      Exam position: Lithotomy position.      Vagina: Normal.      Cervix: Normal.      Rectum: Normal.   Musculoskeletal:         General: Normal range of motion.      Cervical back: Full passive range of motion without pain and normal range of motion.      Right lower leg: No edema.      Left lower leg: No edema.   Lymphadenopathy:      Head:      Right side of head: No submental or submandibular adenopathy.      Left side of head: No submental or submandibular adenopathy.   Skin:     General: Skin is warm and dry.   Neurological:      General: No focal deficit present.      Mental Status: She is alert.      Gait: Gait is intact.   Psychiatric:         Attention and Perception: Attention normal.         Mood and Affect: Mood normal.         Speech: Speech normal.       /72   Ht 172.7 cm (68\")   Wt 85.7 kg (189 lb)   BMI 28.74 kg/m²     Assessment & Plan   Diagnoses and all orders for this visit:    1. Well female exam with routine gynecological exam (Primary)    2. Screening mammogram for breast cancer  -     Mammo Screening Digital Tomosynthesis Bilateral With CAD; Future    3. Screening for blood or protein in urine  -     POC Urinalysis Dipstick    4. Screening for human papillomavirus (HPV)  -     IGP, Apt HPV,rfx 16 / 18,45    5. Screening for malignant neoplasm of cervix  -     IGP, Apt HPV,rfx 16 / 18,45    6. S/P laparoscopic " supracervical hysterectomy      Pap today discussed guidelines  All questions answered.  Vaginal Replens over the counter for any vaginal dryness  Breast self exam technique reviewed and patient encouraged to perform self-exam monthly.  Discussed healthy lifestyle modifications.  Recommended 30 minutes of aerobic exercise five times per week.  Discussed vit d and calcium needs to prevent osteoporosis.

## 2024-03-18 LAB
CYTOLOGIST CVX/VAG CYTO: NORMAL
CYTOLOGY CVX/VAG DOC CYTO: NORMAL
CYTOLOGY CVX/VAG DOC THIN PREP: NORMAL
DX ICD CODE: NORMAL
HPV I/H RISK 4 DNA CVX QL PROBE+SIG AMP: NEGATIVE
Lab: NORMAL
OTHER STN SPEC: NORMAL
STAT OF ADQ CVX/VAG CYTO-IMP: NORMAL

## 2024-04-11 DIAGNOSIS — E89.0 POSTABLATIVE HYPOTHYROIDISM: ICD-10-CM

## 2024-04-11 DIAGNOSIS — C73 PAPILLARY THYROID CARCINOMA: ICD-10-CM

## 2024-04-11 RX ORDER — LEVOTHYROXINE SODIUM 88 MCG
TABLET ORAL
Qty: 90 TABLET | Refills: 0 | OUTPATIENT
Start: 2024-04-11

## 2024-04-16 ENCOUNTER — APPOINTMENT (OUTPATIENT)
Dept: WOMENS IMAGING | Facility: HOSPITAL | Age: 64
End: 2024-04-16
Payer: COMMERCIAL

## 2024-04-16 PROCEDURE — 77063 BREAST TOMOSYNTHESIS BI: CPT | Performed by: RADIOLOGY

## 2024-04-16 PROCEDURE — 77067 SCR MAMMO BI INCL CAD: CPT | Performed by: RADIOLOGY

## 2025-03-18 ENCOUNTER — OFFICE VISIT (OUTPATIENT)
Dept: OBSTETRICS AND GYNECOLOGY | Age: 65
End: 2025-03-18
Payer: COMMERCIAL

## 2025-03-18 VITALS
HEIGHT: 68 IN | WEIGHT: 194 LBS | DIASTOLIC BLOOD PRESSURE: 80 MMHG | SYSTOLIC BLOOD PRESSURE: 132 MMHG | BODY MASS INDEX: 29.4 KG/M2

## 2025-03-18 DIAGNOSIS — Z12.31 ENCOUNTER FOR SCREENING MAMMOGRAM FOR MALIGNANT NEOPLASM OF BREAST: Primary | ICD-10-CM

## 2025-03-18 DIAGNOSIS — Z01.419 ENCOUNTER FOR GYNECOLOGICAL EXAMINATION WITHOUT ABNORMAL FINDING: ICD-10-CM

## 2025-03-18 NOTE — PROGRESS NOTES
Routine Annual Visit    3/18/2025    Patient: Tiera Siegel          MR#:[id number]      Chief Complaint   Patient presents with    Gynecologic Exam     AE Today, Last AE 3/14/2024 (-), HPV (-), MG 3/14/2024, Dexa 2022, Colonoscopy 2022       History of Present Illness    64 y.o. female  who presents for annual exam.     History of Present Illness  The patient presents for a well-woman exam.    She reports no significant health issues, with the exception of weight gain associated with menopause. She has been engaging in physical activity, including house painting, which has resulted in muscle soreness. She owns an exercise bike that provides both arm and leg workouts. Additionally, she utilizes a yoga mat for stretching exercises to alleviate lower back pain due to arthritis. She has recently consulted her primary care physician and ensures regular follow-ups with all her healthcare providers. 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 She reports no abdominal pain, diarrhea, itching, irritation, or discharge. She is not sexually active.    She has been experiencing regular constipation, which she manages with prune consumption.    She has noticed a change in her urine color to a more yellow hue, which she attributes to the consumption of a protein drink containing vitamins, honey, yogurt, and soy. She takes Synthroid early in the morning, avoiding concurrent intake with the protein drink due to potential interference from soy. She recalls a similar change in urine color when she was previously on vitamin B supplements. She reports no urinary incontinence or frequency.    She is up-to-date with her colon cancer screening, which is scheduled every 5 years, with the next one due in 2 years. Her last Pap smear was conducted in  and yielded normal results.        No LMP recorded. Patient has had a hysterectomy.  Obstetric History:  OB History          0    Para   0    Term   0       0    AB    0    Living   0         SAB   0    IAB   0    Ectopic   0    Molar   0    Multiple   0    Live Births   0               Menstrual History:     No LMP recorded. Patient has had a hysterectomy.       ________________________________________  Patient Active Problem List   Diagnosis    Cancer of thyroid    Papillary thyroid carcinoma    Post-operative hypothyroidism    Abnormal weight    Chronic fatigue    Vitamin D deficiency    Rash    Surgical menopause    Hot flash, menopausal    S/P laparoscopic supracervical hysterectomy    Abdominal mass, RLQ (right lower quadrant)    Depression    Family history of colon cancer    Family history of malignant neoplasm of gastrointestinal tract    NATHALIE (generalized anxiety disorder)    Hypercholesteremia    Hypothyroidism    Screen for colon cancer    Rosacea    Liver lesion    Internal hemorrhoids       Past Medical History:   Diagnosis Date    Allergic ?    Anxiety and depression     Arthritis ?    Diabetes mellitus 2022    Pre-diabetic    Endometriosis     Fibrocystic breast     Hx of radiation therapy     Hyperlipidemia ?    Hypothyroidism     Osteopenia     Raynaud's syndrome     Thyroid cancer        Family History   Problem Relation Age of Onset    Colon cancer Father     Thyroid disease Father     Cancer Father         Colon cancer    Arthritis Father         Rheumatoid arthritis    Diabetes Father     Heart disease Father     Hypertension Mother     Breast cancer Maternal Aunt     Ovarian cancer Neg Hx     Uterine cancer Neg Hx        Past Surgical History:   Procedure Laterality Date    BILATERAL SALPINGO OOPHORECTOMY      BREAST EXCISIONAL BIOPSY      lipoma    COLONOSCOPY      HYSTERECTOMY SUPRACERVICAL  2012    AUB, perimenopause, endometriosis. did have BSO    LIPOMA EXCISION      ABDOMAN    OOPHORECTOMY      THYROID SURGERY  01/2003    TONSILLECTOMY      TOTAL THYROIDECTOMY         Social History     Tobacco Use   Smoking Status Never    Passive exposure: Never  "  Smokeless Tobacco Never       has a current medication list which includes the following prescription(s): b complex-c, calcium carb-cholecalciferol, vitamin d3, ezetimibe, prosacea, ketotifen fumarate, loratadine, lorazepam, magnesium, mometasone, naproxen sodium, synthroid, vitamin c, zinc, and venlafaxine xr.  ________________________________________        Objective   Physical Exam    /80   Ht 172.7 cm (68\")   Wt 88 kg (194 lb)   BMI 29.50 kg/m²    BP Readings from Last 3 Encounters:   03/18/25 132/80   03/14/24 128/72   08/18/23 114/70      Wt Readings from Last 3 Encounters:   03/18/25 88 kg (194 lb)   03/14/24 85.7 kg (189 lb)   08/18/23 85 kg (187 lb 6.4 oz)         BMI: Body mass index is 29.5 kg/m².       General:   alert, appears stated age, and cooperative   Neck: No thyromegaly or LAD   Abdomen: soft, non-tender, without masses or organomegaly   Breast: inspection negative, no nipple discharge or bleeding, no masses or nodularity palpable   Urethra and bladder: urethral meatus normal; bladder nontender to palpation;   Vulva: normal, Bartholin's, Urethra, [place]'s normal   Vagina: Normal but atrophic mucosa   Cervix: no lesions and nulliparous appearance   Uterus: absent    Adnexa: normal adnexa and no mass, fullness, tenderness       Assessment:    normal annual exam   Hx supracervical hysterectomy  menopause    Plan:    Plan     Assessment & Plan  1. Menopausal weight gain.  She reports difficulty losing weight gained during menopause. She has been painting her house, which has caused muscle soreness, indicating a need for regular exercise. She is advised to engage in strength training to maintain muscle mass and prevent future weakness. Cardio exercises are also recommended for cardiovascular health. She can consider going to the gym or meeting with a  to develop a home exercise routine.    2. Constipation.  She experiences regular constipation but manages it by eating prunes.    3. " Health maintenance.  She is up to date with her colon cancer screening, which is scheduled every 5 years. Her last Pap smear was normal, and she is on a 3-year plan for future screenings. She has been consistent with her Pap smears throughout her life. A mammogram has been ordered for this year.        [x]  Mammogram request made  []  PAP done  []  Labs:   []  GC/Chl/TV  []  DEXA scan   []  Referral for colonoscopy:       Counseling  [x] Menopause  [x]  Nutrition  [x]  Physical activity/regular exercise   [x]  Healthy weight  []  Injury prevention  []  Smoking cessation  []  Substance misuse/abuse  []  Sexual behavior  []  STD prevention  []  Contraception  []  Dental health  []  Mental health  []  Immunization  [x]  Encouraged SBE        Ankita Gee MD  03/18/2025  10:43 EDT    Patient or patient representative verbalized consent for the use of Ambient Listening during the visit with  Ankita Gee MD for chart documentation. 3/18/2025  10:45 EDT

## 2025-04-17 ENCOUNTER — APPOINTMENT (OUTPATIENT)
Dept: WOMENS IMAGING | Facility: HOSPITAL | Age: 65
End: 2025-04-17
Payer: COMMERCIAL

## 2025-04-17 PROCEDURE — 77063 BREAST TOMOSYNTHESIS BI: CPT | Performed by: RADIOLOGY

## 2025-04-17 PROCEDURE — 77067 SCR MAMMO BI INCL CAD: CPT | Performed by: RADIOLOGY
